# Patient Record
Sex: FEMALE | Race: BLACK OR AFRICAN AMERICAN | NOT HISPANIC OR LATINO | URBAN - METROPOLITAN AREA
[De-identification: names, ages, dates, MRNs, and addresses within clinical notes are randomized per-mention and may not be internally consistent; named-entity substitution may affect disease eponyms.]

---

## 2017-01-27 ENCOUNTER — EMERGENCY (EMERGENCY)
Facility: HOSPITAL | Age: 37
LOS: 0 days | Discharge: HOME | End: 2017-01-27
Attending: EMERGENCY MEDICINE
Payer: COMMERCIAL

## 2017-01-27 VITALS
HEART RATE: 89 BPM | TEMPERATURE: 98 F | SYSTOLIC BLOOD PRESSURE: 126 MMHG | WEIGHT: 169.98 LBS | HEIGHT: 69 IN | RESPIRATION RATE: 20 BRPM | DIASTOLIC BLOOD PRESSURE: 67 MMHG | OXYGEN SATURATION: 96 %

## 2017-01-27 VITALS — SYSTOLIC BLOOD PRESSURE: 117 MMHG | HEART RATE: 89 BPM | TEMPERATURE: 99 F | DIASTOLIC BLOOD PRESSURE: 63 MMHG

## 2017-01-27 DIAGNOSIS — R11.0 NAUSEA: ICD-10-CM

## 2017-01-27 DIAGNOSIS — O21.0 MILD HYPEREMESIS GRAVIDARUM: ICD-10-CM

## 2017-01-27 LAB
ALBUMIN SERPL ELPH-MCNC: 3.4 G/DL — SIGNIFICANT CHANGE UP (ref 3.3–5)
ALP SERPL-CCNC: 62 U/L — SIGNIFICANT CHANGE UP (ref 40–120)
ALT FLD-CCNC: 23 U/L — SIGNIFICANT CHANGE UP (ref 12–78)
ANION GAP SERPL CALC-SCNC: 11 MMOL/L — SIGNIFICANT CHANGE UP (ref 5–17)
APPEARANCE UR: CLEAR — SIGNIFICANT CHANGE UP
AST SERPL-CCNC: 17 U/L — SIGNIFICANT CHANGE UP (ref 15–37)
BASOPHILS # BLD AUTO: 0 K/UL — SIGNIFICANT CHANGE UP (ref 0–0.2)
BASOPHILS NFR BLD AUTO: 0.5 % — SIGNIFICANT CHANGE UP (ref 0–2)
BILIRUB SERPL-MCNC: 0.4 MG/DL — SIGNIFICANT CHANGE UP (ref 0.2–1.2)
BILIRUB UR-MCNC: NEGATIVE — SIGNIFICANT CHANGE UP
BLD GP AB SCN SERPL QL: SIGNIFICANT CHANGE UP
BUN SERPL-MCNC: 7 MG/DL — SIGNIFICANT CHANGE UP (ref 7–23)
CALCIUM SERPL-MCNC: 9 MG/DL — SIGNIFICANT CHANGE UP (ref 8.5–10.1)
CHLORIDE SERPL-SCNC: 103 MMOL/L — SIGNIFICANT CHANGE UP (ref 96–108)
CO2 SERPL-SCNC: 25 MMOL/L — SIGNIFICANT CHANGE UP (ref 22–31)
COLOR SPEC: YELLOW — SIGNIFICANT CHANGE UP
COMMENT - URINE: SIGNIFICANT CHANGE UP
CREAT SERPL-MCNC: 0.7 MG/DL — SIGNIFICANT CHANGE UP (ref 0.5–1.3)
DIFF PNL FLD: NEGATIVE — SIGNIFICANT CHANGE UP
EOSINOPHIL # BLD AUTO: 0.2 K/UL — SIGNIFICANT CHANGE UP (ref 0–0.5)
EOSINOPHIL NFR BLD AUTO: 3 % — SIGNIFICANT CHANGE UP (ref 0–6)
EPI CELLS # UR: SIGNIFICANT CHANGE UP
GLUCOSE SERPL-MCNC: 95 MG/DL — SIGNIFICANT CHANGE UP (ref 70–99)
GLUCOSE UR QL: NEGATIVE MG/DL — SIGNIFICANT CHANGE UP
HCG SERPL-ACNC: SIGNIFICANT CHANGE UP MIU/ML
HCT VFR BLD CALC: 37.9 % — SIGNIFICANT CHANGE UP (ref 34.5–45)
HGB BLD-MCNC: 12.8 G/DL — SIGNIFICANT CHANGE UP (ref 11.5–15.5)
KETONES UR-MCNC: ABNORMAL
LEUKOCYTE ESTERASE UR-ACNC: NEGATIVE — SIGNIFICANT CHANGE UP
LYMPHOCYTES # BLD AUTO: 1.9 K/UL — SIGNIFICANT CHANGE UP (ref 1–3.3)
LYMPHOCYTES # BLD AUTO: 23.9 % — SIGNIFICANT CHANGE UP (ref 13–44)
MCHC RBC-ENTMCNC: 28.3 PG — SIGNIFICANT CHANGE UP (ref 27–34)
MCHC RBC-ENTMCNC: 33.6 GM/DL — SIGNIFICANT CHANGE UP (ref 32–36)
MCV RBC AUTO: 84.1 FL — SIGNIFICANT CHANGE UP (ref 80–100)
MONOCYTES # BLD AUTO: 0.6 K/UL — SIGNIFICANT CHANGE UP (ref 0–0.9)
MONOCYTES NFR BLD AUTO: 7.9 % — SIGNIFICANT CHANGE UP (ref 2–14)
NEUTROPHILS # BLD AUTO: 5.3 K/UL — SIGNIFICANT CHANGE UP (ref 1.8–7.4)
NEUTROPHILS NFR BLD AUTO: 64.7 % — SIGNIFICANT CHANGE UP (ref 43–77)
NITRITE UR-MCNC: NEGATIVE — SIGNIFICANT CHANGE UP
PH UR: 6 — SIGNIFICANT CHANGE UP (ref 4.8–8)
PLATELET # BLD AUTO: 355 K/UL — SIGNIFICANT CHANGE UP (ref 150–400)
POTASSIUM SERPL-MCNC: 3.5 MMOL/L — SIGNIFICANT CHANGE UP (ref 3.5–5.3)
POTASSIUM SERPL-SCNC: 3.5 MMOL/L — SIGNIFICANT CHANGE UP (ref 3.5–5.3)
PROT SERPL-MCNC: 8.2 GM/DL — SIGNIFICANT CHANGE UP (ref 6–8.3)
PROT UR-MCNC: 30 MG/DL
RBC # BLD: 4.51 M/UL — SIGNIFICANT CHANGE UP (ref 3.8–5.2)
RBC # FLD: 14.8 % — SIGNIFICANT CHANGE UP (ref 11–15)
SODIUM SERPL-SCNC: 139 MMOL/L — SIGNIFICANT CHANGE UP (ref 135–145)
SP GR SPEC: 1.02 — SIGNIFICANT CHANGE UP (ref 1.01–1.02)
UROBILINOGEN FLD QL: NEGATIVE MG/DL — SIGNIFICANT CHANGE UP
WBC # BLD: 8.1 K/UL — SIGNIFICANT CHANGE UP (ref 3.8–10.5)
WBC # FLD AUTO: 8.1 K/UL — SIGNIFICANT CHANGE UP (ref 3.8–10.5)
WBC UR QL: SIGNIFICANT CHANGE UP

## 2017-01-27 PROCEDURE — 76856 US EXAM PELVIC COMPLETE: CPT | Mod: 26

## 2017-01-27 PROCEDURE — 76815 OB US LIMITED FETUS(S): CPT | Mod: 26

## 2017-01-27 PROCEDURE — 76817 TRANSVAGINAL US OBSTETRIC: CPT | Mod: 26

## 2017-01-27 PROCEDURE — 99285 EMERGENCY DEPT VISIT HI MDM: CPT | Mod: 25

## 2017-01-27 RX ORDER — ONDANSETRON 8 MG/1
1 TABLET, FILM COATED ORAL
Qty: 18 | Refills: 0 | OUTPATIENT
Start: 2017-01-27 | End: 2017-02-02

## 2017-01-27 RX ORDER — ONDANSETRON 8 MG/1
8 TABLET, FILM COATED ORAL ONCE
Qty: 0 | Refills: 0 | Status: COMPLETED | OUTPATIENT
Start: 2017-01-27 | End: 2017-01-27

## 2017-01-27 RX ORDER — PYRIDOXINE HCL (VITAMIN B6) 100 MG
100 TABLET ORAL ONCE
Qty: 0 | Refills: 0 | Status: COMPLETED | OUTPATIENT
Start: 2017-01-27 | End: 2017-01-27

## 2017-01-27 RX ORDER — SODIUM CHLORIDE 9 MG/ML
2000 INJECTION INTRAMUSCULAR; INTRAVENOUS; SUBCUTANEOUS ONCE
Qty: 0 | Refills: 0 | Status: COMPLETED | OUTPATIENT
Start: 2017-01-27 | End: 2017-01-27

## 2017-01-27 RX ADMIN — Medication 100 MILLIGRAM(S): at 01:47

## 2017-01-27 RX ADMIN — SODIUM CHLORIDE 2000 MILLILITER(S): 9 INJECTION INTRAMUSCULAR; INTRAVENOUS; SUBCUTANEOUS at 01:47

## 2017-01-27 RX ADMIN — ONDANSETRON 8 MILLIGRAM(S): 8 TABLET, FILM COATED ORAL at 01:47

## 2017-01-27 RX ADMIN — Medication 202 MILLIGRAM(S): at 05:12

## 2017-01-27 NOTE — ED ADULT TRIAGE NOTE - CHIEF COMPLAINT QUOTE
"9 1/2 weeks pregnant,nausea ,vomiting for weeks,on and off lower abdominal cramping,no vaginal bleed"

## 2017-01-27 NOTE — ED PROVIDER NOTE - MEDICAL DECISION MAKING DETAILS
patient pw nausea and vomiting. iup present. hyperemesis gravidarum suspected. vit b6 and zofran. patient feeling better.

## 2017-01-27 NOTE — ED PROVIDER NOTE - OBJECTIVE STATEMENT
Pertinent PMH/PSH/FHx/SHx and Review of Systems contained within:  36f  at estimated 10wks pw nasuea and vomiting with abd cramping that started today. she has had previous experiences with her other pregnancies. no vaginal bleeding, no contractions. has not had a 1st trim sono. no fever, no unusual foods. has not tried anything for nausea yet  Fh and Sh not otherwise contributory  ROS otherwise negative

## 2017-01-28 LAB
CULTURE RESULTS: SIGNIFICANT CHANGE UP
SPECIMEN SOURCE: SIGNIFICANT CHANGE UP

## 2017-02-17 ENCOUNTER — EMERGENCY (EMERGENCY)
Facility: HOSPITAL | Age: 37
LOS: 0 days | Discharge: HOME | End: 2017-02-18
Attending: EMERGENCY MEDICINE
Payer: COMMERCIAL

## 2017-02-17 VITALS
WEIGHT: 175.05 LBS | TEMPERATURE: 99 F | RESPIRATION RATE: 16 BRPM | SYSTOLIC BLOOD PRESSURE: 125 MMHG | OXYGEN SATURATION: 98 % | HEIGHT: 69 IN | HEART RATE: 113 BPM | DIASTOLIC BLOOD PRESSURE: 67 MMHG

## 2017-02-17 DIAGNOSIS — R11.0 NAUSEA: ICD-10-CM

## 2017-02-17 DIAGNOSIS — O21.0 MILD HYPEREMESIS GRAVIDARUM: ICD-10-CM

## 2017-02-17 DIAGNOSIS — J45.901 UNSPECIFIED ASTHMA WITH (ACUTE) EXACERBATION: ICD-10-CM

## 2017-02-17 PROCEDURE — 99284 EMERGENCY DEPT VISIT MOD MDM: CPT

## 2017-02-18 VITALS
RESPIRATION RATE: 20 BRPM | TEMPERATURE: 100 F | SYSTOLIC BLOOD PRESSURE: 122 MMHG | HEART RATE: 112 BPM | DIASTOLIC BLOOD PRESSURE: 56 MMHG | OXYGEN SATURATION: 98 %

## 2017-02-18 LAB
ALBUMIN SERPL ELPH-MCNC: 3 G/DL — LOW (ref 3.3–5)
ALP SERPL-CCNC: 54 U/L — SIGNIFICANT CHANGE UP (ref 40–120)
ALT FLD-CCNC: 17 U/L — SIGNIFICANT CHANGE UP (ref 12–78)
ANION GAP SERPL CALC-SCNC: 15 MMOL/L — SIGNIFICANT CHANGE UP (ref 5–17)
APPEARANCE UR: CLEAR — SIGNIFICANT CHANGE UP
AST SERPL-CCNC: 17 U/L — SIGNIFICANT CHANGE UP (ref 15–37)
BACTERIA # UR AUTO: ABNORMAL
BASOPHILS # BLD AUTO: 0.1 K/UL — SIGNIFICANT CHANGE UP (ref 0–0.2)
BASOPHILS NFR BLD AUTO: 1.2 % — SIGNIFICANT CHANGE UP (ref 0–2)
BILIRUB SERPL-MCNC: 0.4 MG/DL — SIGNIFICANT CHANGE UP (ref 0.2–1.2)
BILIRUB UR-MCNC: NEGATIVE — SIGNIFICANT CHANGE UP
BUN SERPL-MCNC: 7 MG/DL — SIGNIFICANT CHANGE UP (ref 7–23)
CALCIUM SERPL-MCNC: 8.3 MG/DL — LOW (ref 8.5–10.1)
CHLORIDE SERPL-SCNC: 100 MMOL/L — SIGNIFICANT CHANGE UP (ref 96–108)
CO2 SERPL-SCNC: 21 MMOL/L — LOW (ref 22–31)
COLOR SPEC: YELLOW — SIGNIFICANT CHANGE UP
COMMENT - URINE: SIGNIFICANT CHANGE UP
CREAT SERPL-MCNC: 0.75 MG/DL — SIGNIFICANT CHANGE UP (ref 0.5–1.3)
DIFF PNL FLD: NEGATIVE — SIGNIFICANT CHANGE UP
EOSINOPHIL # BLD AUTO: 0 K/UL — SIGNIFICANT CHANGE UP (ref 0–0.5)
EOSINOPHIL NFR BLD AUTO: 0.2 % — SIGNIFICANT CHANGE UP (ref 0–6)
EPI CELLS # UR: SIGNIFICANT CHANGE UP
FLUAV SPEC QL CULT: NEGATIVE — SIGNIFICANT CHANGE UP
FLUBV AG SPEC QL IA: NEGATIVE — SIGNIFICANT CHANGE UP
GLUCOSE SERPL-MCNC: 102 MG/DL — HIGH (ref 70–99)
GLUCOSE UR QL: 100 MG/DL
HCG SERPL-ACNC: SIGNIFICANT CHANGE UP MIU/ML
HCT VFR BLD CALC: 32.5 % — LOW (ref 34.5–45)
HGB BLD-MCNC: 11.5 G/DL — SIGNIFICANT CHANGE UP (ref 11.5–15.5)
HYALINE CASTS # UR AUTO: ABNORMAL /LPF
KETONES UR-MCNC: ABNORMAL
LEUKOCYTE ESTERASE UR-ACNC: NEGATIVE — SIGNIFICANT CHANGE UP
LYMPHOCYTES # BLD AUTO: 0.9 K/UL — LOW (ref 1–3.3)
LYMPHOCYTES # BLD AUTO: 14.2 % — SIGNIFICANT CHANGE UP (ref 13–44)
MCHC RBC-ENTMCNC: 28.5 PG — SIGNIFICANT CHANGE UP (ref 27–34)
MCHC RBC-ENTMCNC: 35.4 GM/DL — SIGNIFICANT CHANGE UP (ref 32–36)
MCV RBC AUTO: 80.5 FL — SIGNIFICANT CHANGE UP (ref 80–100)
MONOCYTES # BLD AUTO: 0.6 K/UL — SIGNIFICANT CHANGE UP (ref 0–0.9)
MONOCYTES NFR BLD AUTO: 9.7 % — SIGNIFICANT CHANGE UP (ref 2–14)
NEUTROPHILS # BLD AUTO: 4.7 K/UL — SIGNIFICANT CHANGE UP (ref 1.8–7.4)
NEUTROPHILS NFR BLD AUTO: 74.8 % — SIGNIFICANT CHANGE UP (ref 43–77)
NITRITE UR-MCNC: NEGATIVE — SIGNIFICANT CHANGE UP
PH UR: 6 — SIGNIFICANT CHANGE UP (ref 4.8–8)
PLATELET # BLD AUTO: 311 K/UL — SIGNIFICANT CHANGE UP (ref 150–400)
POTASSIUM SERPL-MCNC: 3.1 MMOL/L — LOW (ref 3.5–5.3)
POTASSIUM SERPL-SCNC: 3.1 MMOL/L — LOW (ref 3.5–5.3)
PROT SERPL-MCNC: 7.5 GM/DL — SIGNIFICANT CHANGE UP (ref 6–8.3)
PROT UR-MCNC: 30 MG/DL
RBC # BLD: 4.04 M/UL — SIGNIFICANT CHANGE UP (ref 3.8–5.2)
RBC # FLD: 14.2 % — SIGNIFICANT CHANGE UP (ref 11–15)
RBC CASTS # UR COMP ASSIST: SIGNIFICANT CHANGE UP /HPF (ref 0–4)
SODIUM SERPL-SCNC: 136 MMOL/L — SIGNIFICANT CHANGE UP (ref 135–145)
SP GR SPEC: 1.02 — SIGNIFICANT CHANGE UP (ref 1.01–1.02)
UROBILINOGEN FLD QL: 1 MG/DL
WBC # BLD: 6.4 K/UL — SIGNIFICANT CHANGE UP (ref 3.8–10.5)
WBC # FLD AUTO: 6.4 K/UL — SIGNIFICANT CHANGE UP (ref 3.8–10.5)
WBC UR QL: SIGNIFICANT CHANGE UP

## 2017-02-18 RX ORDER — SODIUM CHLORIDE 9 MG/ML
1000 INJECTION INTRAMUSCULAR; INTRAVENOUS; SUBCUTANEOUS ONCE
Qty: 0 | Refills: 0 | Status: COMPLETED | OUTPATIENT
Start: 2017-02-18 | End: 2017-02-18

## 2017-02-18 RX ORDER — ACETAMINOPHEN 500 MG
975 TABLET ORAL ONCE
Qty: 0 | Refills: 0 | Status: COMPLETED | OUTPATIENT
Start: 2017-02-18 | End: 2017-02-18

## 2017-02-18 RX ORDER — PYRIDOXINE HCL (VITAMIN B6) 100 MG
5 TABLET ORAL ONCE
Qty: 0 | Refills: 0 | Status: DISCONTINUED | OUTPATIENT
Start: 2017-02-18 | End: 2017-02-18

## 2017-02-18 RX ORDER — METOCLOPRAMIDE HCL 10 MG
10 TABLET ORAL ONCE
Qty: 0 | Refills: 0 | Status: COMPLETED | OUTPATIENT
Start: 2017-02-18 | End: 2017-02-18

## 2017-02-18 RX ORDER — POTASSIUM CHLORIDE 20 MEQ
40 PACKET (EA) ORAL ONCE
Qty: 0 | Refills: 0 | Status: COMPLETED | OUTPATIENT
Start: 2017-02-18 | End: 2017-02-18

## 2017-02-18 RX ORDER — AMOXICILLIN 250 MG/5ML
1 SUSPENSION, RECONSTITUTED, ORAL (ML) ORAL
Qty: 20 | Refills: 0 | OUTPATIENT
Start: 2017-02-18 | End: 2017-02-28

## 2017-02-18 RX ORDER — ALBUTEROL 90 UG/1
2 AEROSOL, METERED ORAL
Qty: 1 | Refills: 0 | OUTPATIENT
Start: 2017-02-18 | End: 2017-03-20

## 2017-02-18 RX ORDER — ALBUTEROL 90 UG/1
2.5 AEROSOL, METERED ORAL ONCE
Qty: 0 | Refills: 0 | Status: COMPLETED | OUTPATIENT
Start: 2017-02-18 | End: 2017-02-18

## 2017-02-18 RX ORDER — IPRATROPIUM/ALBUTEROL SULFATE 18-103MCG
3 AEROSOL WITH ADAPTER (GRAM) INHALATION ONCE
Qty: 0 | Refills: 0 | Status: COMPLETED | OUTPATIENT
Start: 2017-02-18 | End: 2017-02-18

## 2017-02-18 RX ORDER — DOXYLAMINE SUCCINATE AND PYRIDOXINE HYDROCHLORIDE, DELAYED RELEASE TABLETS 10 MG/10 MG 10; 10 MG/1; MG/1
2 TABLET, DELAYED RELEASE ORAL
Qty: 60 | Refills: 0 | OUTPATIENT
Start: 2017-02-18 | End: 2017-03-20

## 2017-02-18 RX ADMIN — Medication 3 MILLILITER(S): at 01:44

## 2017-02-18 RX ADMIN — Medication 975 MILLIGRAM(S): at 04:57

## 2017-02-18 RX ADMIN — ALBUTEROL 2.5 MILLIGRAM(S): 90 AEROSOL, METERED ORAL at 01:47

## 2017-02-18 RX ADMIN — SODIUM CHLORIDE 1000 MILLILITER(S): 9 INJECTION INTRAMUSCULAR; INTRAVENOUS; SUBCUTANEOUS at 01:44

## 2017-02-18 RX ADMIN — Medication 40 MILLIEQUIVALENT(S): at 03:05

## 2017-02-18 RX ADMIN — SODIUM CHLORIDE 1000 MILLILITER(S): 9 INJECTION INTRAMUSCULAR; INTRAVENOUS; SUBCUTANEOUS at 01:45

## 2017-02-18 RX ADMIN — Medication 10 MILLIGRAM(S): at 01:44

## 2017-02-18 NOTE — ED PROVIDER NOTE - OBJECTIVE STATEMENT
Pertinent PMH/PSH/FHx/SHx and Review of Systems contained within:  Patient with history of asthma and hyperemesis gravidarum (never admitted) is  @ 15 weeks by US, presents to the ED for shortness of breath, nausea, and vomiting.  Says that she has presented to the ER with same issues in the past requiring control of nausea and asthma.  Nonsmoker, but reports a cough with clear sputum x2 days which she believes triggered her asthma.  Denies any calf pain or swelling, says that this feels like her typical asthma exacerbation.  Patient has had similar issues with previous pregnancies.  Denies any vaginal discharge or bleeding.     No fever/chills, No photophobia/eye pain/changes in vision, No ear pain/sore throat/dysphagia, No chest pain/palpitations, No abdominal pain, no dysuria/frequency/discharge, No neck/back pain, no rash, no changes in neurological status/function.

## 2017-02-18 NOTE — ED PROVIDER NOTE - MEDICAL DECISION MAKING DETAILS
Patient is pregnant with recent mild cough, asthma attack, and hyperemesis gravidarum.  Patient's presentation is not unusual for her.  PE considered, but unlikely.  Patient felt much better after IVF, antiemetic, and nebs, requesting discharge.  Requests scripts for antiemetic and inhaler.  No antibiotics since cough is 2 days, mild, and no fevers.  UA with mild contamination, no symptoms, culture sent.  Discussed results and outcome of today's visit with the patient.  Patient advised to please follow up with their primary care doctor and obstetrician within the next 24 hours and return to the Emergency Department for worsening symptoms or any other concerns.  Patient advised that their doctor may call  to follow up on the specific results of the tests performed today in the emergency department.   Patient appears well on discharge. Patient is pregnant with recent mild cough, asthma attack, and hyperemesis gravidarum.  Patient's presentation is not unusual for her.  PE considered, but unlikely.  Patient felt much better after IVF, antiemetic, and nebs, requesting discharge.  Patient was to be discharged but found to be febrile. Patient is pregnant with recent mild cough, asthma attack, and hyperemesis gravidarum.  Patient's presentation is not unusual for her.  PE considered, but unlikely.  Patient felt much better after IVF, antiemetic, and nebs, requesting discharge.  Patient was to be discharged but found to be febrile.  Influenza negative.  Appears non toxic, non septic.  Will cover with abx.  Felt much improved after tylenol.  Recommend ongoing supportive care with tylenol/fluids/rest and prompt OB follow up today.  Discussed results and outcome of today's visit with the patient.  Patient advised to please follow up with their primary care doctor and OB today and return to the Emergency Department for worsening symptoms or any other concerns.  Patient advised that their doctor may call  to follow up on the specific results of the tests performed today in the emergency department.   Patient appears well on discharge.  Patient given prescription medications for their condition and advised to take them as prescribed and check with their Primary Care Provider if any questions arise.

## 2017-02-19 LAB
CULTURE RESULTS: SIGNIFICANT CHANGE UP
SPECIMEN SOURCE: SIGNIFICANT CHANGE UP

## 2017-05-31 ENCOUNTER — INPATIENT (INPATIENT)
Facility: HOSPITAL | Age: 37
LOS: 1 days | Discharge: HOME | End: 2017-06-02
Attending: INTERNAL MEDICINE | Admitting: INTERNAL MEDICINE
Payer: COMMERCIAL

## 2017-05-31 VITALS
WEIGHT: 175.05 LBS | OXYGEN SATURATION: 99 % | HEART RATE: 128 BPM | TEMPERATURE: 98 F | DIASTOLIC BLOOD PRESSURE: 74 MMHG | RESPIRATION RATE: 20 BRPM | SYSTOLIC BLOOD PRESSURE: 122 MMHG

## 2017-05-31 LAB
BASOPHILS # BLD AUTO: 0.01 K/UL — SIGNIFICANT CHANGE UP (ref 0–0.2)
BASOPHILS NFR BLD AUTO: 0.1 % — SIGNIFICANT CHANGE UP (ref 0–2)
BUN SERPL-MCNC: 8 MG/DL — SIGNIFICANT CHANGE UP (ref 7–23)
CALCIUM SERPL-MCNC: 8.5 MG/DL — SIGNIFICANT CHANGE UP (ref 8.4–10.5)
CHLORIDE SERPL-SCNC: 100 MMOL/L — SIGNIFICANT CHANGE UP (ref 98–107)
CO2 SERPL-SCNC: 18 MMOL/L — LOW (ref 22–31)
CREAT SERPL-MCNC: 0.73 MG/DL — SIGNIFICANT CHANGE UP (ref 0.5–1.3)
EOSINOPHIL # BLD AUTO: 0.01 K/UL — SIGNIFICANT CHANGE UP (ref 0–0.5)
EOSINOPHIL NFR BLD AUTO: 0.1 % — SIGNIFICANT CHANGE UP (ref 0–6)
GLUCOSE SERPL-MCNC: 171 MG/DL — HIGH (ref 70–99)
HCT VFR BLD CALC: 28.9 % — LOW (ref 34.5–45)
HGB BLD-MCNC: 9.3 G/DL — LOW (ref 11.5–15.5)
IMM GRANULOCYTES NFR BLD AUTO: 0.7 % — SIGNIFICANT CHANGE UP (ref 0–1.5)
LYMPHOCYTES # BLD AUTO: 0.47 K/UL — LOW (ref 1–3.3)
LYMPHOCYTES # BLD AUTO: 5.3 % — LOW (ref 13–44)
MAGNESIUM SERPL-MCNC: 1.8 MG/DL — SIGNIFICANT CHANGE UP (ref 1.6–2.6)
MCHC RBC-ENTMCNC: 27 PG — SIGNIFICANT CHANGE UP (ref 27–34)
MCHC RBC-ENTMCNC: 32.2 % — SIGNIFICANT CHANGE UP (ref 32–36)
MCV RBC AUTO: 84 FL — SIGNIFICANT CHANGE UP (ref 80–100)
MONOCYTES # BLD AUTO: 0.13 K/UL — SIGNIFICANT CHANGE UP (ref 0–0.9)
MONOCYTES NFR BLD AUTO: 1.5 % — LOW (ref 2–14)
NEUTROPHILS # BLD AUTO: 8.22 K/UL — HIGH (ref 1.8–7.4)
NEUTROPHILS NFR BLD AUTO: 92.3 % — HIGH (ref 43–77)
PLATELET # BLD AUTO: 261 K/UL — SIGNIFICANT CHANGE UP (ref 150–400)
PMV BLD: 9.7 FL — SIGNIFICANT CHANGE UP (ref 7–13)
POTASSIUM SERPL-MCNC: 3.2 MMOL/L — LOW (ref 3.5–5.3)
POTASSIUM SERPL-SCNC: 3.2 MMOL/L — LOW (ref 3.5–5.3)
RBC # BLD: 3.44 M/UL — LOW (ref 3.8–5.2)
RBC # FLD: 16 % — HIGH (ref 10.3–14.5)
SODIUM SERPL-SCNC: 135 MMOL/L — SIGNIFICANT CHANGE UP (ref 135–145)
WBC # BLD: 8.9 K/UL — SIGNIFICANT CHANGE UP (ref 3.8–10.5)
WBC # FLD AUTO: 8.9 K/UL — SIGNIFICANT CHANGE UP (ref 3.8–10.5)

## 2017-05-31 PROCEDURE — 71020: CPT | Mod: 26

## 2017-05-31 RX ORDER — IPRATROPIUM/ALBUTEROL SULFATE 18-103MCG
3 AEROSOL WITH ADAPTER (GRAM) INHALATION ONCE
Qty: 0 | Refills: 0 | Status: COMPLETED | OUTPATIENT
Start: 2017-05-31 | End: 2017-05-31

## 2017-05-31 RX ORDER — POTASSIUM CHLORIDE 20 MEQ
40 PACKET (EA) ORAL ONCE
Qty: 0 | Refills: 0 | Status: COMPLETED | OUTPATIENT
Start: 2017-05-31 | End: 2017-05-31

## 2017-05-31 RX ORDER — IPRATROPIUM/ALBUTEROL SULFATE 18-103MCG
3 AEROSOL WITH ADAPTER (GRAM) INHALATION EVERY 4 HOURS
Qty: 0 | Refills: 0 | Status: DISCONTINUED | OUTPATIENT
Start: 2017-05-31 | End: 2017-06-02

## 2017-05-31 RX ORDER — SODIUM CHLORIDE 9 MG/ML
1000 INJECTION INTRAMUSCULAR; INTRAVENOUS; SUBCUTANEOUS ONCE
Qty: 0 | Refills: 0 | Status: COMPLETED | OUTPATIENT
Start: 2017-05-31 | End: 2017-05-31

## 2017-05-31 RX ORDER — SODIUM CHLORIDE 9 MG/ML
1000 INJECTION INTRAMUSCULAR; INTRAVENOUS; SUBCUTANEOUS
Qty: 0 | Refills: 0 | Status: DISCONTINUED | OUTPATIENT
Start: 2017-05-31 | End: 2017-06-01

## 2017-05-31 RX ORDER — ACETAMINOPHEN 500 MG
650 TABLET ORAL ONCE
Qty: 0 | Refills: 0 | Status: COMPLETED | OUTPATIENT
Start: 2017-05-31 | End: 2017-05-31

## 2017-05-31 RX ORDER — SODIUM CHLORIDE 9 MG/ML
3 INJECTION INTRAMUSCULAR; INTRAVENOUS; SUBCUTANEOUS ONCE
Qty: 0 | Refills: 0 | Status: COMPLETED | OUTPATIENT
Start: 2017-05-31 | End: 2017-05-31

## 2017-05-31 RX ADMIN — Medication 650 MILLIGRAM(S): at 18:00

## 2017-05-31 RX ADMIN — Medication 60 MILLIGRAM(S): at 13:12

## 2017-05-31 RX ADMIN — SODIUM CHLORIDE 3 MILLILITER(S): 9 INJECTION INTRAMUSCULAR; INTRAVENOUS; SUBCUTANEOUS at 18:26

## 2017-05-31 RX ADMIN — SODIUM CHLORIDE 125 MILLILITER(S): 9 INJECTION INTRAMUSCULAR; INTRAVENOUS; SUBCUTANEOUS at 20:46

## 2017-05-31 RX ADMIN — Medication 3 MILLILITER(S): at 23:40

## 2017-05-31 RX ADMIN — Medication 650 MILLIGRAM(S): at 16:04

## 2017-05-31 RX ADMIN — Medication 3 MILLILITER(S): at 20:17

## 2017-05-31 RX ADMIN — Medication 3 MILLILITER(S): at 16:55

## 2017-05-31 RX ADMIN — Medication 650 MILLIGRAM(S): at 13:38

## 2017-05-31 RX ADMIN — Medication 650 MILLIGRAM(S): at 18:30

## 2017-05-31 RX ADMIN — Medication 40 MILLIEQUIVALENT(S): at 20:18

## 2017-05-31 RX ADMIN — Medication 3 MILLILITER(S): at 13:12

## 2017-05-31 RX ADMIN — SODIUM CHLORIDE 2000 MILLILITER(S): 9 INJECTION INTRAMUSCULAR; INTRAVENOUS; SUBCUTANEOUS at 18:27

## 2017-05-31 RX ADMIN — Medication 3 MILLILITER(S): at 13:38

## 2017-05-31 NOTE — ED PROVIDER NOTE - OBJECTIVE STATEMENT
37F h/o asthma  29wks pregnant presenting with difficulty breathing. Symptoms started 2 days ago with productive cough (yellow), sore throat, wheezing, and difficulty breathing, sick contact at home with similar symptoms. Notes 4 ER visits this pregnancy for asthma-related symptoms. Has been using albuterol bid for past 2 days without improvement. Has never been admitted for asthma.

## 2017-05-31 NOTE — ED CDU PROVIDER NOTE - OBJECTIVE STATEMENT
36 y/o female with pmhx of asthma,  29 weeks pregnant, due date 17 presents to ED with       Home meds: none 38 y/o female with pmhx of asthma (since teenager, no intubations), iron deficiency anemia,  29 weeks pregnant, due date 17 presents to ED with productive cough x 3 days. Pt states her sons are sick at home with coughs. c/o productive cough with clear sputum associated with SOB x 3 days, no chest pain. Treatments have helped in ED although still c/o mild SOB now. States her symptoms are at baseline with her asthma exacerbations. Pt states her nausea is at baseline with her pregnancy and morning sickness, no vomiting. No fever, chills, earache, sore throat, nasal congestion, cp, palpitations, recent surgeries, calf pain/swelling,       Home meds: none 36 y/o female with pmhx of asthma (since teenager, no intubations), iron deficiency anemia,  29 weeks pregnant, due date 17 presents to ED with productive cough x 3 days. Pt states her sons are sick at home with coughs. Pt c/o productive cough with clear sputum associated with SOB x 3 days, no chest pain. +body aches, +sore throat. Treatments have helped in ED although still c/o mild SOB now. States her symptoms are at baseline with her asthma exacerbations. Pt endorses nausea at baseline with her pregnancy and morning sickness, no vomiting. No fever, chills, earache, difficulty swallowing, nasal congestion, cp, palpitations, syncope, recent surgeries, calf pain/swelling, abdominal pain, vomiting, weakness, numbness, urinary complaints.      Home meds: none 38 y/o female with pmhx of asthma (since teenager, no intubations, couple visits to [facility] during pregnancy for asthma), iron deficiency anemia,  29 weeks pregnant, due date 17 presents to ED with productive cough x 3 days. Pt states her sons are sick at home with coughs. Pt c/o productive cough with clear sputum associated with SOB x 3 days, no chest pain. +body aches, +sore throat. Treatments have helped in ED although still c/o mild SOB now. States her symptoms are at baseline with her asthma exacerbations. Pt endorses nausea at baseline with her pregnancy and morning sickness, no vomiting. No fever, chills, earache, difficulty swallowing, nasal congestion, cp, hemoptysis, palpitations, syncope, recent surgeries, calf pain/swelling, abdominal pain, vomiting, weakness, numbness, urinary complaints.    Home meds: none

## 2017-05-31 NOTE — ED PROVIDER NOTE - ATTENDING CONTRIBUTION TO CARE
29 weeks hx of asthma, c/o productive cough of green sputum, associated with viral syndrome and low grade fever which exacerbated asthma. Exam; decreased BS left side with wheezing; right side scant wheezing; peak flow 200 with questionable effort. Given symptoms X-ray clinically indicated; continue nebulizers; prednisone; consider CDU

## 2017-05-31 NOTE — ED CDU PROVIDER NOTE - MEDICAL DECISION MAKING DETAILS
38 y/o female with pmhx of asthma (since teenager, no intubations, couple visits to [facility] during pregnancy for asthma), iron deficiency anemia,  29 weeks pregnant, due date 17 presents to ED with productive cough x 3 days. Plan: OBGYN consult, caty x 4, continue prednisone once daily tomrorow, fluids, reassess

## 2017-05-31 NOTE — ED PROVIDER NOTE - MEDICAL DECISION MAKING DETAILS
37F  29wks h/o asthma p/w SOB, cough, wheezing, multiple ER visits for asthma this pregnancy, concerning for asthma exac  -nebs, steroids, XR, re-assess

## 2017-05-31 NOTE — ED ADULT TRIAGE NOTE - CCCP TRG CHIEF CMPLNT
29 wks preg w SOB, since Monday, w productive, yellow cough, NO abd/pregnany complaints 29 wks preg w SOB, since Monday, w productive, yellow cough, NO abd/pregnancy complaints

## 2017-05-31 NOTE — ED ADULT NURSE NOTE - OBJECTIVE STATEMENT
Patient received in Intake RM 12 A&Ox3 and ambulatory at baseline. Patient 29 weeks pregnant c/o asthma exacerbation since Sunday. Symptoms unrelieved with saline nebulizer treatments at home. Denies fevers, chills, abdominal pain, N/V/D, abdominal cramping, vaginal bleeding. PE and history as noted below.

## 2017-05-31 NOTE — ED PROVIDER NOTE - PROGRESS NOTE DETAILS
Repeat peak flow 320 after first 2 nebs, pt feeling better, resp exam with improved air movement. Will observe for further evaluation.

## 2017-05-31 NOTE — ED CDU PROVIDER NOTE - PROGRESS NOTE DETAILS
ANTONIO Ospinau - Fetal HR confirmed at bedside at 154. RVP panel +HMPV, pt placed on contact. OBGYN aware of pt, awaiting consultation. ANTONIO Hoffman - Spoke with OBGYN, recommending outpt f/u for 1AC and will do further fetal monitoring. Spoke with pulmonary, agrees with plan and supportive management, will f/u VBG and most likely see pt in the AM, states pt stable. ANTONIO Hoffman - Spoke with PEGGY Galarza, recommending outpt f/u for 1AC and will do further fetal monitoring, no concern for PE. Spoke with pulmonary, agrees with plan and supportive management, will f/u VBG and most likely see pt in the AM, states pt stable. ANTONIO Hoffman - pt c/o one episode of NBNB vomiting, nausea at baseline due to pregnancy. will give zofran 4mg IV. ANTONIO Hoffman - pt was sleeping in bed comfortably, NAD. pt states she is feeling better. Exam: no intercostal retractions, speaking in full sentences. +mild wheezing on upper lobes b/l. awaiting pulm consult today. pt will be signed out to day team. ANTONIO Markham: pt signed out to me from overnight, tolerating q 4 nebs however reports she feels tight/wheezing after 2 or 3 hours from neb. Seen by pulm in CDU, advised to d/c fluids, prednisone 20 qd, and continue nebs. Will recommend controller med for home however patient is feeling uncomfortable going home given continued symptoms. Pulm agrees with plan for admission to medicine as pt is cleared by OB in ED stay. Pt agrees with plan. Admitted to hospitalist Blank as pts PMD is associated with [facility]. AJM: Patient received at signout. Still with mild wheezing and coughing. seen by pulm who recommends admission given multiple asthma visits to er during this pregnancy. Pt comfortable with plan.

## 2017-05-31 NOTE — CONSULT NOTE ADULT - SUBJECTIVE AND OBJECTIVE BOX
37y G_P_ presents with   HPI:      OB/GYN HISTORY:   [place]:  Parity:  Term:  :  MAB/TAB/Ectopic:  Living:    Last Menstrual Period    Name of GYN Physician:  Date of Last Pap:  History of Abnormal Pap:  Date of Last Mammogram:  Date of Last Colonoscopy:  Current OCP use:     PAST MEDICAL & SURGICAL HISTORY:  Iron deficiency  Asthma  No significant past surgical history      REVIEW OF SYSTEMS      General:	    Skin/Breast:  	  Ophthalmologic:  	  ENMT:	    Respiratory and Thorax:  	  Cardiovascular:	    Gastrointestinal:	    Genitourinary:	    Musculoskeletal:	    Neurological:	    Psychiatric:	    Hematology/Lymphatics:	    Endocrine:	    Allergic/Immunologic:	    MEDICATIONS  (STANDING):  sodium chloride 0.9%. 1000milliLiter(s) IV Continuous <Continuous>  ALBUTerol/ipratropium for Nebulization 3milliLiter(s) Nebulizer every 4 hours    MEDICATIONS  (PRN):      Allergies    No Known Allergies    Intolerances        SOCIAL HISTORY:    FAMILY HISTORY:  No pertinent family history in first degree relatives      Vital Signs Last 24 Hrs  T(C): 36.7, Max: 36.7 ( @ 11:46)  T(F): 98, Max: 98 ( @ 11:46)  HR: 108 (108 - 128)  BP: 115/45 (106/55 - 122/74)  BP(mean): --  RR: 20 (16 - 20)  SpO2: 98% (98% - 100%)    PHYSICAL EXAM:      Constitutional: alert and oriented x 3    Breasts: no tenderness, no nodules    Respiratory: clear    Cardiovascular: regular rate and rhythm    Gastrointestinal: soft, non tender, + bowel sounds. No hepatosplenomegaly, no palpable masses    Genitourinary: NEFG  Cervix: closed/ long, no CMT  Uterus: normal size, non tender  Adnexa: non tender, no palpable masses    Rectal:     Extremities:    Neurological:    Skin:    Lymph Nodes:        LABS:                        9.3    8.90  )-----------( 261      ( 31 May 2017 18:28 )             28.9         135  |  100  |  8   ----------------------------<  171<H>  3.2<L>   |  18<L>  |  0.73    Ca    8.5      31 May 2017 18:20  Mg     1.8                 RADIOLOGY & ADDITIONAL STUDIES: 36yo  at 29.4w (EDC 17 by early sono) presents with CC of productive cough and SOB for 3 days. Patient has a history of worsening asthma in pregnancy. Initially symptoms began as non productive cough on Monday. Eventually she began to produce Unremarkable hospital course. Patient hemodynamically stable. Medically cleared for discharge sputum then yellow sputum on Tuesday. Patient has had progressively worsening SOB since Tuesday. She was self treating with her son's Albuterol pump by using it twice per day and saline drops for congestion. Patient denies any CP, hemoptysis, N, V, fevers and chills. Deneis diarrhea, changes in bowel habits, urinary symptoms. Denies LOF, VB, and contractions. Endorses good fetal movement. Deneis and PNC complications, sono abnormalities, and abnormal prenatal testing.     Patient deneis any history of hospital admission for asthma although she has at least 1 yearly ED visit for an asthma exacerbation. Patient endorses multiple hospital admissions as a child, though denies any history of ICU admission or intubation. During this pregnancy, patient has had an asthma exacerbation every 2 months. She has no pulmonologist or PCP following her asthma.     POb:  x2, both uncomplicated deliveries, c/b worsening asthma during PNC, denies hospital admissions during those pregnancies. Both babies were in the 6lbs range.     PGyn: Deneis    PMH: Asthma as described above    PSH: ACL repair ()    Meds: Albuterol PRN    All: NKDA    Vital Signs Last 24 Hrs  T(C): 36.7, Max: 36.7 ( @ 11:46)  T(F): 98, Max: 98 ( @ 11:46)  HR: 108 (108 - 128)  BP: 115/45 (106/55 - 122/74)  BP(mean): --  RR: 20 (16 - 20)  SpO2: 98% (98% - 100%)    PHYSICAL EXAM:    Constitutional: alert and oriented x 3, NAD, some labored breathing  Respiratory: wheezes in the right lower lung lobe, otherwise CTA  Cardiovascular: regular rate and rhythm    LABS:    RVP: positive for hMPV                        9.3    8.90  )-----------( 261      ( 31 May 2017 18:28 )             28.9         135  |  100  |  8   ----------------------------<  171<H>  3.2<L>   |  18<L>  |  0.73    Ca    8.5      31 May 2017 18:20  Mg     1.8         RADIOLOGY & ADDITIONAL STUDIES:    CXR:    Upright frontal and lateral chest from 2017 at 1327. No prior chest   x-ray available at this institution for comparison.    Lead apron partially visualized over abdominal region.     IMPRESSION:  Clear lungs. No pleural effusions or pneumothorax.    Cardiac and mediastinal silhouettes within normal limits.    Trachea midline.    Unremarkable osseous structures.    Interval note: Patient has received 4 duonebs since ED presentation and 1 dose of prednisone.

## 2017-05-31 NOTE — ED CDU PROVIDER NOTE - PLAN OF CARE
Use inhaler as directed. Follow up with your PMD and your OBGYN. Rest, drink plenty of fluids.  Advance activity as tolerated.  Continue all previously prescribed medications as directed. You can use motrin 600mg every 6-8 hours for pain or fever, and/or Tylenol 650 mg every 4 hours for pain/fever. Follow up with your primary care physician in 48-72 hours- bring copies of your results.  Return to the emergency department for chest pain, shortness of breath, dizziness, or worsening, concerning, or persistent symptoms.

## 2017-05-31 NOTE — CONSULT NOTE ADULT - ASSESSMENT
A/P: 36yo @ 29.4w with URI caused by hMPV with superimposed asthma exacerbation in the setting of asthma worsened in pregnancy. Will perform NST/BPP evaluation. Recommend pulmonary consultation for further recommendations as well as follow up as an outpatient. Patient denies GDM and abnormal prenatal testing, HgA1c is 6. Discussed with patient and PA in ED to follow up this lab with her outpatient OBGYN.    Cindi, R2 d/w Dr. Mederos

## 2017-06-01 DIAGNOSIS — J45.901 UNSPECIFIED ASTHMA WITH (ACUTE) EXACERBATION: ICD-10-CM

## 2017-06-01 DIAGNOSIS — B97.81 HUMAN METAPNEUMOVIRUS AS THE CAUSE OF DISEASES CLASSIFIED ELSEWHERE: ICD-10-CM

## 2017-06-01 DIAGNOSIS — O99.519 DISEASES OF THE RESPIRATORY SYSTEM COMPLICATING PREGNANCY, UNSPECIFIED TRIMESTER: ICD-10-CM

## 2017-06-01 DIAGNOSIS — B34.9 VIRAL INFECTION, UNSPECIFIED: ICD-10-CM

## 2017-06-01 LAB
BASE EXCESS BLDV CALC-SCNC: -6.9 MMOL/L — SIGNIFICANT CHANGE UP
BLOOD GAS VENOUS - CREATININE: 0.63 MG/DL — SIGNIFICANT CHANGE UP (ref 0.5–1.3)
CHLORIDE BLDV-SCNC: 109 MMOL/L — HIGH (ref 96–108)
GAS PNL BLDV: 134 MMOL/L — LOW (ref 136–146)
GLUCOSE BLDV-MCNC: 138 — HIGH (ref 70–99)
HCO3 BLDV-SCNC: 19 MMOL/L — LOW (ref 20–27)
HCT VFR BLDV CALC: 25.5 % — LOW (ref 34.5–45)
HGB BLDV-MCNC: 8.2 G/DL — LOW (ref 11.5–15.5)
LACTATE BLDV-MCNC: 1.6 MMOL/L — SIGNIFICANT CHANGE UP (ref 0.5–2)
PCO2 BLDV: 28 MMHG — LOW (ref 41–51)
PH BLDV: 7.4 PH — SIGNIFICANT CHANGE UP (ref 7.32–7.43)
PO2 BLDV: 87 MMHG — HIGH (ref 35–40)
POTASSIUM BLDV-SCNC: 3.6 MMOL/L — SIGNIFICANT CHANGE UP (ref 3.4–4.5)
SAO2 % BLDV: 97 % — HIGH (ref 60–85)

## 2017-06-01 PROCEDURE — 99223 1ST HOSP IP/OBS HIGH 75: CPT | Mod: AI

## 2017-06-01 RX ORDER — ONDANSETRON 8 MG/1
4 TABLET, FILM COATED ORAL ONCE
Qty: 0 | Refills: 0 | Status: COMPLETED | OUTPATIENT
Start: 2017-06-01 | End: 2017-06-01

## 2017-06-01 RX ORDER — BUDESONIDE, MICRONIZED 100 %
1 POWDER (GRAM) MISCELLANEOUS
Qty: 0 | Refills: 0 | Status: DISCONTINUED | OUTPATIENT
Start: 2017-06-01 | End: 2017-06-02

## 2017-06-01 RX ORDER — ACETAMINOPHEN 500 MG
650 TABLET ORAL EVERY 6 HOURS
Qty: 0 | Refills: 0 | Status: DISCONTINUED | OUTPATIENT
Start: 2017-06-01 | End: 2017-06-02

## 2017-06-01 RX ADMIN — Medication 3 MILLILITER(S): at 21:34

## 2017-06-01 RX ADMIN — Medication 3 MILLILITER(S): at 06:55

## 2017-06-01 RX ADMIN — Medication 3 MILLILITER(S): at 10:50

## 2017-06-01 RX ADMIN — Medication 1 PUFF(S): at 22:30

## 2017-06-01 RX ADMIN — Medication 3 MILLILITER(S): at 15:19

## 2017-06-01 RX ADMIN — Medication 3 MILLILITER(S): at 04:05

## 2017-06-01 RX ADMIN — ONDANSETRON 4 MILLIGRAM(S): 8 TABLET, FILM COATED ORAL at 16:45

## 2017-06-01 RX ADMIN — ONDANSETRON 4 MILLIGRAM(S): 8 TABLET, FILM COATED ORAL at 01:27

## 2017-06-01 RX ADMIN — Medication 60 MILLIGRAM(S): at 06:55

## 2017-06-01 NOTE — CONSULT NOTE ADULT - ATTENDING COMMENTS
37 year old woman 29 weeks pregnant asthma since age 7 never hospitalized or intubated but has about 1 ED visit a year much more frequent since pregnancy has had 3-4 ED visits this pregnancy not on any control medications. Now entero/rhino positive has had sick contacts.    - admit  - prednisone 20 daily  -start inhaled steroids  - albuterol Q6 ATC  - will follow

## 2017-06-01 NOTE — DISCHARGE NOTE ADULT - PROVIDER TOKENS
FREE:[LAST:[[facility]],PHONE:[phone]],FAX:[(   [phone]    -],ADDRESS:[[phone]]]

## 2017-06-01 NOTE — DISCHARGE NOTE ADULT - PLAN OF CARE
Contniue with prednisone for a total of 5 days and nebulizer Please call and schedule appointment to follow up at [facility] pulmonary clinic as outpatient at [phone] Continue with prednisone for a total of 5 days and nebulizer HgA1c was 6.0 on workup. Please follow up with your OBGYN for further management to rule out gestational diabetes. Continue supportive care. Follow up at [facility] pulmonary clinic as outpatient at [phone] Please call and schedule appointment to follow up at [facility] pulmonary clinic as outpatient at [phone].

## 2017-06-01 NOTE — DISCHARGE NOTE ADULT - PATIENT PORTAL LINK FT
“You can access the Follow[facility] Patient Portal, offered by [facility], by registering with the following website: [email or web address]”

## 2017-06-01 NOTE — DISCHARGE NOTE ADULT - HOSPITAL COURSE
38 yo female with ho asthma, 29 weeks pregnant a/w acute asthma exacerbation due to hMPV.      Asthma exacerbation  -apprec pulm eval  -pred PO  -nebs  -pulmicort  -anticipate d/c in AM.     hMPV  supportive care  robitussin prn  tylenol prn.     Appreciate pulmonary note --> - prednisone 20 mg qd for 5 days   - start ICS - pulmicort inhaled BID   - continue with albuterol q6h nebs standing for today  - will need the nebulizer on discharge [she only has the inhaler at home]   - will need to f/u at the pulmonary clinic as outpt - [phone]    6/1 - will re-evaluate patient tomorrow 36 yo female with ho asthma, 29 weeks pregnant a/w acute asthma exacerbation due to hMPV.      Asthma exacerbation  -apprec pulm eval  -pred PO  -nebs  -pulmicort  -anticipate d/c in AM.     hMPV  supportive care  robitussin prn  tylenol prn.     Appreciate pulmonary note --> - prednisone 20 mg qd for 5 days   - start ICS - pulmicort inhaled BID   - continue with albuterol q6h nebs standing for today  - will need the nebulizer on discharge [she only has the inhaler at home]   - will need to f/u at the pulmonary clinic as outpt - [phone]    Dispo: medically stable for Home

## 2017-06-01 NOTE — CONSULT NOTE ADULT - ASSESSMENT
Ms. Johnson is a 37 year old woman [] - 29.4 wks pregnant with asthma, presents to the ED with 3 days of chest tightness, cough and wheezing, likely with asthma exacerbation in the setting of +hMPV. This is her 4th ED visit for similar episodes. She is not on any maintenance, but will need to be daily inhalers now, at least through the duration of her pregnancy.    Patient does not feel comfortable going home right now and does not feel like she is back to baseline. Agree that she should be observed / admitted for at least one more day.     Recommend:   - prednisone 20 mg qd for 5 days   - start ICS - pulmicort inhaled BID   - continue with albuterol q6h nebs standing for today  - will need the nebulizer on discharge [she only has the inhaler at home]   - will need to f/u at the pulmonary clinic as outpt - [phone]    please f/u attg note later today.     Romero Aceves MD  Pulmonary Fellow

## 2017-06-01 NOTE — DISCHARGE NOTE ADULT - MEDICATION SUMMARY - MEDICATIONS TO TAKE
I will START or STAY ON the medications listed below when I get home from the hospital:    budesonide 90 mcg/inh inhalation powder  -- 1 puff(s) inhaled 2 times a day  -- Indication: For Asthma    predniSONE 20 mg oral tablet  -- 1 tab(s) by mouth once a day  -- Indication: For Asthma exacerbation    Diclegis 10 mg-10 mg oral delayed release tablet  -- 2 tab(s) by mouth once a day (at bedtime)  -- Do not chew, break, or crush.  Do not drink alcoholic beverages when taking this medication.  May cause drowsiness.  Alcohol may intensify this effect.  Use care when operating dangerous machinery.  Some non-prescription drugs may aggravate your condition.  Read all labels carefully.  If a warning appears, check with your doctor before taking.  Swallow whole.  Do not crush.  Take medication on an empty stomach 1 hour before or 2 to 3 hours after a meal unless otherwise directed by your doctor.  This drug may impair the ability to drive or operate machinery.  Use care until you become familiar with its effects.    -- Indication: For Nausea/vomitting     albuterol 0.63 mg/3 mL (0.021%) inhalation solution  -- 3 milliliter(s) inhaled every 6 hours, As Needed -for shortness of breath and/or wheezing  -- For inhalation only.  It is very important that you take or use this exactly as directed.  Do not skip doses or discontinue unless directed by your doctor.  Obtain medical advice before taking any non-prescription drugs as some may affect the action of this medication.    -- Indication: For Asthma    Azithromycin 5 Day Dose Pack 250 mg oral tablet  -- Take Z-Sven as directed  -- Do not take dairy products, antacids, or iron preparations within one hour of this medication.  Finish all this medication unless otherwise directed by prescriber.    -- Indication: For Human metapneumovirus (hMPV) as cause of disease classified elsewhere    Flonase 50 mcg/inh nasal spray  -- 1 spray(s) into nose 2 times a day  -- For the nose.  It is very important that you take or use this exactly as directed.  Do not skip doses or discontinue unless directed by your doctor.    -- Indication: For Viral infection I will START or STAY ON the medications listed below when I get home from the hospital:    budesonide 90 mcg/inh inhalation powder  -- 1 puff(s) inhaled 2 times a day  -- Indication: For Asthma    predniSONE 20 mg oral tablet  -- 1 tab(s) by mouth once a day  -- Indication: For Asthma exacerbation    Diclegis 10 mg-10 mg oral delayed release tablet  -- 2 tab(s) by mouth once a day (at bedtime)  -- Do not chew, break, or crush.  Do not drink alcoholic beverages when taking this medication.  May cause drowsiness.  Alcohol may intensify this effect.  Use care when operating dangerous machinery.  Some non-prescription drugs may aggravate your condition.  Read all labels carefully.  If a warning appears, check with your doctor before taking.  Swallow whole.  Do not crush.  Take medication on an empty stomach 1 hour before or 2 to 3 hours after a meal unless otherwise directed by your doctor.  This drug may impair the ability to drive or operate machinery.  Use care until you become familiar with its effects.    -- Indication: For Nausea/vomitting     albuterol 2.5 mg/3 mL (0.083%) inhalation solution  -- 3 milliliter(s) inhaled every 6 hours, As Needed -for shortness of breath and/or wheezing  -- For inhalation only.  It is very important that you take or use this exactly as directed.  Do not skip doses or discontinue unless directed by your doctor.  Obtain medical advice before taking any non-prescription drugs as some may affect the action of this medication.    -- Indication: For Asthma    Azithromycin 5 Day Dose Pack 250 mg oral tablet  -- Take Z-Sven as directed  -- Do not take dairy products, antacids, or iron preparations within one hour of this medication.  Finish all this medication unless otherwise directed by prescriber.    -- Indication: For Human metapneumovirus (hMPV) as cause of disease classified elsewhere    Flonase 50 mcg/inh nasal spray  -- 1 spray(s) into nose 2 times a day  -- For the nose.  It is very important that you take or use this exactly as directed.  Do not skip doses or discontinue unless directed by your doctor.    -- Indication: For Viral infection I will START or STAY ON the medications listed below when I get home from the hospital:    budesonide 90 mcg/inh inhalation powder  -- 1 puff(s) inhaled 2 times a day  -- Indication: For Asthma    predniSONE 20 mg oral tablet  -- 1 tab(s) by mouth once a day  -- Indication: For Asthma exacerbation    Diclegis 10 mg-10 mg oral delayed release tablet  -- 2 tab(s) by mouth once a day (at bedtime)  -- Do not chew, break, or crush.  Do not drink alcoholic beverages when taking this medication.  May cause drowsiness.  Alcohol may intensify this effect.  Use care when operating dangerous machinery.  Some non-prescription drugs may aggravate your condition.  Read all labels carefully.  If a warning appears, check with your doctor before taking.  Swallow whole.  Do not crush.  Take medication on an empty stomach 1 hour before or 2 to 3 hours after a meal unless otherwise directed by your doctor.  This drug may impair the ability to drive or operate machinery.  Use care until you become familiar with its effects.    -- Indication: For Nausea/vomitting     albuterol 2.5 mg/3 mL (0.083%) inhalation solution  -- 3 milliliter(s) inhaled every 6 hours, As Needed -for shortness of breath and/or wheezing  -- For inhalation only.  It is very important that you take or use this exactly as directed.  Do not skip doses or discontinue unless directed by your doctor.  Obtain medical advice before taking any non-prescription drugs as some may affect the action of this medication.    -- Indication: For Asthma    Flonase 50 mcg/inh nasal spray  -- 1 spray(s) into nose 2 times a day  -- For the nose.  It is very important that you take or use this exactly as directed.  Do not skip doses or discontinue unless directed by your doctor.    -- Indication: For Viral infection

## 2017-06-01 NOTE — CONSULT NOTE ADULT - SUBJECTIVE AND OBJECTIVE BOX
CHIEF COMPLAINT: asthma     HPI: Ms. Johnson is a 37 year old woman [] - 29.4 wks pregnant with asthma, presents to the ED with 3 days of chest tightness, cough and wheezing.     3 d ago, she developed a cough - clear sputum after being exposed to the AC. The cough progressed to chest tightness and wheezing. She got more SOB and decided to come to the ED. No fevers. +sick contact - 2 of her kids at home had colds. No rhinnorhea. +sore throat. No leg swelling.     This is her 3-4th ED visit during this pregnancy for wheezing and SOB. She is only on albuterol PRN and takes her kid's nebulizer as needed. She was dx with asthma as a child - hospitalized many times as a child, but nothing as an adult. Her asthma triggers are +colds and pollen. No carpets or pets at home. During her previous two pregnancies, she has had similar asthma problems. Does not have a pulmonologist.    She received prednisone 60 mg x1 and duonebs x3-4.     PAST MEDICAL & SURGICAL HISTORY:  Iron deficiency  Asthma  No significant past surgical history      FAMILY HISTORY:  No pertinent family history in first degree relatives      SOCIAL HISTORY:  Smoking: never  EtOH Use: none  Marital Status:   Occupation: works in an office    Allergies  No Known Allergies    HOME MEDICATIONS:  albuterol prn   multivitamins    REVIEW OF SYSTEMS:  see HPI    OBJECTIVE:  ICU Vital Signs Last 24 Hrs  T(C): 36.9, Max: 36.9 ( @ 00:50)  T(F): 98.4, Max: 98.5 ( @ 00:50)  HR: 109 (108 - 127)  BP: 116/58 (102/47 - 120/41)  RR: 18 (16 - 20)  SpO2: 98% (96% - 100%) on RA    PHYSICAL EXAM:  General:  lying in bed. comfortable.  Lymph Nodes: no cervical LAD  Respiratory: mild scatter expiratory wheezing. no crackles.   Cardiovascular: nl rate and reg rhythm. nl s1, s2. no m/r/g.  Abdomen: gravid  Extremities: no edema  Skin: no clubbing  Neurological: AOx3    HOSPITAL MEDICATIONS:  MEDICATIONS  (STANDING):  ALBUTerol/ipratropium for Nebulization 3milliLiter(s) Nebulizer every 4 hours    MEDICATIONS  (PRN):      LABS:                        9.3    8.90  )-----------( 261      ( 31 May 2017 18:28 )             28.9         135  |  100  |  8   ----------------------------<  171<H>  3.2<L>   |  18<L>  |  0.73    Ca    8.5      31 May 2017 18:20  Mg     1.8           +RVP for hMPV      Venous Blood Gas:   @ 00:50  7.40/28/87/19/97.0  VBG Lactate: 1.6    RADIOLOGY:  CXR: clear

## 2017-06-01 NOTE — H&P ADULT - HISTORY OF PRESENT ILLNESS
38 yo female 29 weeks pregnant, asthma a/w acute asthma exac due to hMPV infection.  Pt reports inc SOB with sick contact at home.  Pt c/o lat chest aches from coughing.  Overall feels better but concerned to go home today.  Informed pt she will likely have a cough/productive cough for few weeks due to this virus and will slowly get better.  She is agreeable to go home tomorrow.

## 2017-06-01 NOTE — DISCHARGE NOTE ADULT - ADDITIONAL INSTRUCTIONS
Please follow up with your OBGYN in regards to your HgA1c being 6.0. Please follow up with your OBGYN in regards to your HgA1c being 6.0.  Follow up with pulmonologist -

## 2017-06-01 NOTE — DISCHARGE NOTE ADULT - MEDICATION SUMMARY - MEDICATIONS TO STOP TAKING
I will STOP taking the medications listed below when I get home from the hospital:    albuterol  --  by mouth    ProAir HFA 90 mcg/inh inhalation aerosol  -- 2 puff(s) inhaled 4 times a day  -- For inhalation only.  It is very important that you take or use this exactly as directed.  Do not skip doses or discontinue unless directed by your doctor.  Obtain medical advice before taking any non-prescription drugs as some may affect the action of this medication.  Shake well before use.    amoxicillin 500 mg oral capsule  -- 1 cap(s) by mouth 2 times a day  -- Finish all this medication unless otherwise directed by prescriber. I will STOP taking the medications listed below when I get home from the hospital:    albuterol  --  by mouth    ProAir HFA 90 mcg/inh inhalation aerosol  -- 2 puff(s) inhaled 4 times a day  -- For inhalation only.  It is very important that you take or use this exactly as directed.  Do not skip doses or discontinue unless directed by your doctor.  Obtain medical advice before taking any non-prescription drugs as some may affect the action of this medication.  Shake well before use.    amoxicillin 500 mg oral capsule  -- 1 cap(s) by mouth 2 times a day  -- Finish all this medication unless otherwise directed by prescriber.    nebulizer

## 2017-06-01 NOTE — DISCHARGE NOTE ADULT - CARE PLAN
Principal Discharge DX:	Asthma exacerbation  Goal:	Contniue with prednisone for a total of 5 days and nebulizer  Instructions for follow-up, activity and diet:	Please call and schedule appointment to follow up at [facility] pulmonary clinic as outpatient at [phone] Principal Discharge DX:	Asthma exacerbation  Goal:	Continue with prednisone for a total of 5 days and nebulizer  Instructions for follow-up, activity and diet:	Please call and schedule appointment to follow up at [facility] pulmonary clinic as outpatient at [phone] Principal Discharge DX:	Asthma exacerbation  Goal:	Continue with prednisone for a total of 5 days and nebulizer  Instructions for follow-up, activity and diet:	Please call and schedule appointment to follow up at [facility] pulmonary clinic as outpatient at [phone] Principal Discharge DX:	Asthma exacerbation  Goal:	Continue with prednisone for a total of 5 days and nebulizer  Instructions for follow-up, activity and diet:	Please call and schedule appointment to follow up at [facility] pulmonary clinic as outpatient at [phone].  Secondary Diagnosis:	Prediabetes  Instructions for follow-up, activity and diet:	HgA1c was 6.0 on workup. Please follow up with your OBGYN for further management to rule out gestational diabetes.  Secondary Diagnosis:	Human metapneumovirus (hMPV) as cause of disease classified elsewhere  Instructions for follow-up, activity and diet:	Continue supportive care. Follow up at [facility] pulmonary clinic as outpatient at [phone] Principal Discharge DX:	Asthma exacerbation  Goal:	Continue with prednisone for a total of 5 days and nebulizer  Instructions for follow-up, activity and diet:	Please call and schedule appointment to follow up at [facility] pulmonary clinic as outpatient at [phone].  Secondary Diagnosis:	Prediabetes  Instructions for follow-up, activity and diet:	HgA1c was 6.0 on workup. Please follow up with your OBGYN for further management to rule out gestational diabetes.  Secondary Diagnosis:	Human metapneumovirus (hMPV) as cause of disease classified elsewhere  Instructions for follow-up, activity and diet:	Continue supportive care. Follow up at [facility] pulmonary clinic as outpatient at [phone] Principal Discharge DX:	Asthma exacerbation  Goal:	Continue with prednisone for a total of 5 days and nebulizer  Instructions for follow-up, activity and diet:	Please call and schedule appointment to follow up at [facility] pulmonary clinic as outpatient at [phone].  Secondary Diagnosis:	Prediabetes  Instructions for follow-up, activity and diet:	HgA1c was 6.0 on workup. Please follow up with your OBGYN for further management to rule out gestational diabetes.  Secondary Diagnosis:	Human metapneumovirus (hMPV) as cause of disease classified elsewhere  Instructions for follow-up, activity and diet:	Continue supportive care. Follow up at [facility] pulmonary clinic as outpatient at [phone] Principal Discharge DX:	Asthma exacerbation  Goal:	Continue with prednisone for a total of 5 days and nebulizer  Instructions for follow-up, activity and diet:	Please call and schedule appointment to follow up at [facility] pulmonary clinic as outpatient at [phone].  Secondary Diagnosis:	Prediabetes  Instructions for follow-up, activity and diet:	HgA1c was 6.0 on workup. Please follow up with your OBGYN for further management to rule out gestational diabetes.  Secondary Diagnosis:	Human metapneumovirus (hMPV) as cause of disease classified elsewhere  Instructions for follow-up, activity and diet:	Continue supportive care. Follow up at [facility] pulmonary clinic as outpatient at [phone] Principal Discharge DX:	Asthma exacerbation  Goal:	Continue with prednisone for a total of 5 days and nebulizer  Instructions for follow-up, activity and diet:	Please call and schedule appointment to follow up at [facility] pulmonary clinic as outpatient at [phone].  Secondary Diagnosis:	Prediabetes  Instructions for follow-up, activity and diet:	HgA1c was 6.0 on workup. Please follow up with your OBGYN for further management to rule out gestational diabetes.  Secondary Diagnosis:	Human metapneumovirus (hMPV) as cause of disease classified elsewhere  Instructions for follow-up, activity and diet:	Continue supportive care. Follow up at [facility] pulmonary clinic as outpatient at [phone]

## 2017-06-02 VITALS
OXYGEN SATURATION: 98 % | HEART RATE: 97 BPM | TEMPERATURE: 98 F | DIASTOLIC BLOOD PRESSURE: 62 MMHG | SYSTOLIC BLOOD PRESSURE: 103 MMHG | RESPIRATION RATE: 18 BRPM

## 2017-06-02 LAB
ALBUMIN SERPL ELPH-MCNC: 3.1 G/DL — LOW (ref 3.3–5)
ALP SERPL-CCNC: 65 U/L — SIGNIFICANT CHANGE UP (ref 40–120)
ALT FLD-CCNC: 12 U/L — SIGNIFICANT CHANGE UP (ref 4–33)
AST SERPL-CCNC: 26 U/L — SIGNIFICANT CHANGE UP (ref 4–32)
BASOPHILS # BLD AUTO: 0.02 K/UL — SIGNIFICANT CHANGE UP (ref 0–0.2)
BASOPHILS NFR BLD AUTO: 0.2 % — SIGNIFICANT CHANGE UP (ref 0–2)
BILIRUB SERPL-MCNC: 0.2 MG/DL — SIGNIFICANT CHANGE UP (ref 0.2–1.2)
BUN SERPL-MCNC: 10 MG/DL — SIGNIFICANT CHANGE UP (ref 7–23)
CALCIUM SERPL-MCNC: 8.5 MG/DL — SIGNIFICANT CHANGE UP (ref 8.4–10.5)
CHLORIDE SERPL-SCNC: 102 MMOL/L — SIGNIFICANT CHANGE UP (ref 98–107)
CO2 SERPL-SCNC: 18 MMOL/L — LOW (ref 22–31)
CREAT SERPL-MCNC: 0.71 MG/DL — SIGNIFICANT CHANGE UP (ref 0.5–1.3)
EOSINOPHIL # BLD AUTO: 0.01 K/UL — SIGNIFICANT CHANGE UP (ref 0–0.5)
EOSINOPHIL NFR BLD AUTO: 0.1 % — SIGNIFICANT CHANGE UP (ref 0–6)
GLUCOSE SERPL-MCNC: 109 MG/DL — HIGH (ref 70–99)
HCT VFR BLD CALC: 26.9 % — LOW (ref 34.5–45)
HGB BLD-MCNC: 8.4 G/DL — LOW (ref 11.5–15.5)
IMM GRANULOCYTES NFR BLD AUTO: 1 % — SIGNIFICANT CHANGE UP (ref 0–1.5)
LYMPHOCYTES # BLD AUTO: 1.24 K/UL — SIGNIFICANT CHANGE UP (ref 1–3.3)
LYMPHOCYTES # BLD AUTO: 15 % — SIGNIFICANT CHANGE UP (ref 13–44)
MCHC RBC-ENTMCNC: 26.6 PG — LOW (ref 27–34)
MCHC RBC-ENTMCNC: 31.2 % — LOW (ref 32–36)
MCV RBC AUTO: 85.1 FL — SIGNIFICANT CHANGE UP (ref 80–100)
MONOCYTES # BLD AUTO: 0.69 K/UL — SIGNIFICANT CHANGE UP (ref 0–0.9)
MONOCYTES NFR BLD AUTO: 8.3 % — SIGNIFICANT CHANGE UP (ref 2–14)
NEUTROPHILS # BLD AUTO: 6.25 K/UL — SIGNIFICANT CHANGE UP (ref 1.8–7.4)
NEUTROPHILS NFR BLD AUTO: 75.4 % — SIGNIFICANT CHANGE UP (ref 43–77)
PLATELET # BLD AUTO: 260 K/UL — SIGNIFICANT CHANGE UP (ref 150–400)
PMV BLD: 9.4 FL — SIGNIFICANT CHANGE UP (ref 7–13)
POTASSIUM SERPL-MCNC: 3.5 MMOL/L — SIGNIFICANT CHANGE UP (ref 3.5–5.3)
POTASSIUM SERPL-SCNC: 3.5 MMOL/L — SIGNIFICANT CHANGE UP (ref 3.5–5.3)
PROT SERPL-MCNC: 6.5 G/DL — SIGNIFICANT CHANGE UP (ref 6–8.3)
RBC # BLD: 3.16 M/UL — LOW (ref 3.8–5.2)
RBC # FLD: 16.3 % — HIGH (ref 10.3–14.5)
SODIUM SERPL-SCNC: 137 MMOL/L — SIGNIFICANT CHANGE UP (ref 135–145)
WBC # BLD: 8.29 K/UL — SIGNIFICANT CHANGE UP (ref 3.8–10.5)
WBC # FLD AUTO: 8.29 K/UL — SIGNIFICANT CHANGE UP (ref 3.8–10.5)

## 2017-06-02 RX ORDER — ALBUTEROL 90 UG/1
3 AEROSOL, METERED ORAL
Qty: 1 | Refills: 0 | OUTPATIENT
Start: 2017-06-02 | End: 2017-07-02

## 2017-06-02 RX ORDER — LEVALBUTEROL 1.25 MG/.5ML
0.63 SOLUTION, CONCENTRATE RESPIRATORY (INHALATION) EVERY 6 HOURS
Qty: 0 | Refills: 0 | Status: DISCONTINUED | OUTPATIENT
Start: 2017-06-02 | End: 2017-06-02

## 2017-06-02 RX ORDER — FLUTICASONE PROPIONATE 50 MCG
1 SPRAY, SUSPENSION NASAL
Qty: 1 | Refills: 0 | OUTPATIENT
Start: 2017-06-02 | End: 2017-06-09

## 2017-06-02 RX ORDER — ONDANSETRON 8 MG/1
4 TABLET, FILM COATED ORAL ONCE
Qty: 0 | Refills: 0 | Status: COMPLETED | OUTPATIENT
Start: 2017-06-02 | End: 2017-06-02

## 2017-06-02 RX ORDER — AZITHROMYCIN 500 MG/1
1 TABLET, FILM COATED ORAL
Qty: 1 | Refills: 0 | OUTPATIENT
Start: 2017-06-02

## 2017-06-02 RX ORDER — BUDESONIDE, MICRONIZED 100 %
1 POWDER (GRAM) MISCELLANEOUS
Qty: 1 | Refills: 0 | OUTPATIENT
Start: 2017-06-02 | End: 2017-07-02

## 2017-06-02 RX ORDER — ALBUTEROL 90 UG/1
3 AEROSOL, METERED ORAL
Qty: 1 | Refills: 0 | OUTPATIENT
Start: 2017-06-02

## 2017-06-02 RX ADMIN — Medication 200 MILLIGRAM(S): at 11:06

## 2017-06-02 RX ADMIN — Medication 1 PUFF(S): at 11:07

## 2017-06-02 RX ADMIN — Medication 3 MILLILITER(S): at 04:59

## 2017-06-02 RX ADMIN — LEVALBUTEROL 0.63 MILLIGRAM(S): 1.25 SOLUTION, CONCENTRATE RESPIRATORY (INHALATION) at 11:12

## 2017-06-02 RX ADMIN — Medication 3 MILLILITER(S): at 00:33

## 2017-06-02 RX ADMIN — Medication 20 MILLIGRAM(S): at 05:02

## 2017-06-02 RX ADMIN — Medication 200 MILLIGRAM(S): at 04:26

## 2017-06-02 RX ADMIN — ONDANSETRON 4 MILLIGRAM(S): 8 TABLET, FILM COATED ORAL at 00:29

## 2017-06-02 NOTE — PROGRESS NOTE ADULT - PROBLEM SELECTOR PLAN 1
1) Asthma Exacerbation  - Improving  - O2 sats stable on room air  - Continue Albuterol, Prednisone and Pulmicort.    - Robitussin PRN  - Pt plans to follow up with Pulmonologist     2) Maternal Well Being  - Regular diet  - Monitor I/Os and VS  - DVT Prophylaxis: ambulation     3) Fetal Well Being  - Daily PNV  - Daily NST   - Follow up with [facility] in [place] in 1 week    4) Appeciate Internal Medicine care  Mt, PGY3

## 2017-06-02 NOTE — CONSULT NOTE ADULT - SUBJECTIVE AND OBJECTIVE BOX
Patient is a 37y old  Female who presents with a chief complaint of SOB, cough (01 Jun 2017 18:09)      HPI:  36 yo female 29 weeks pregnant, asthma a/w acute asthma exac due to hMPV infection.  Pt reports inc SOB with sick contact at home her children.  Pt c/o lat chest aches from coughing.  Overall feels better but concerned to go home today.  Informed pt she will likely have a cough/productive cough for few weeks due to this virus and will slowly get better.  She is agreeable to go home tomorrow. (01 Jun 2017 14:17) She denies any abdominal pain, nausea, vomiting, diarrhea, fever, chills at present. She denies any recent travel. She denies any headache, focal limb weakness, seizure like activity or blurred vision. She denies any leg pain or swelling. She denies any depression, suicidal or homicidal ideations. She denies any vaginal discharge or bleeding. She denies any other complaints at present. She stats that she feels much better than yesterday more than 60 to70 percent improvement and wants to go home.      PAST MEDICAL & SURGICAL HISTORY:  Iron deficiency  Asthma bronchial ( NI, NSD)  No significant past surgical history      Allergies    No Known Allergies    Intolerances        FAMILY HISTORY:  No pertinent family history in first degree relatives      SOCIAL HISTORY:  Negative for SMoking, ETOH, Drugs    MEDICATIONS  (STANDING):  predniSONE   Tablet 20milliGRAM(s) Oral daily  buDESOnide   90 MICROgram(s) Inhaler 1Puff(s) Inhalation two times a day    MEDICATIONS  (PRN):  acetaminophen   Tablet. 650milliGRAM(s) Oral every 6 hours PRN mild and mod and severe pain  guaiFENesin    Syrup 200milliGRAM(s) Oral every 6 hours PRN Cough  levalbuterol Inhalation 0.63milliGRAM(s) Inhalation every 6 hours PRN SOB/Wheezing      REVIEW OF SYSTEMS:    CONSTITUTIONAL:Low grade fever, fatigue, lethagy, denies any weight loss  EYES: No eye pain, visual disturbances, or discharge  ENMT:  No difficulty hearing, tinnitus, vertigo; No sinus or throat pain  NECK: No pain or stiffness  BREASTS: No pain, masses, or nipple discharge  RESPIRATORY: Cough, shortness of breath, wheezing, chest tightness, Denies any hemoptysis  CARDIOVASCULAR: No chest pain, palpitations, dizziness, or leg swelling, syncope, fall  GASTROINTESTINAL: No abdominal or epigastric pain. No nausea, vomiting, or hematemesis; No diarrhea or constipation. No melena or hematochezia.  GENITOURINARY: No dysuria, frequency, hematuria, or incontinence  NEUROLOGICAL: No headaches, memory loss, loss of strength, numbness, or tremors  SKIN: No itching, burning, rashes, or lesions   LYMPH NODES: No enlarged glands  ENDOCRINE: No heat or cold intolerance; No hair loss  MUSCULOSKELETAL: No joint pain or swelling; No muscle, back, or extremity pain  PSYCHIATRIC: No depression, anxiety, mood swings, or difficulty sleeping  HEME/LYMPH: No easy bruising, or bleeding gums  ALLERY AND IMMUNOLOGIC: No hives or eczema    Vital Signs Last 24 Hrs  T(C): 36.7, Max: 36.9 (06-02 @ 05:00)  HR: 97 (97 - 124)  BP: 103/62 (103/62 - 133/81)  RR: 18 (18 - 20)  SpO2: 98% (97% - 100%)  Wt(kg): --    CAPILLARY BLOOD GLUCOSE      I&O's Summary      PHYSICAL EXAM:  GENERAL: NAD, well-developed, well nourished, alert, awake, no acute distress at present  HEAD:  Atraumatic, Normocephalic,   EYES: EOMI, PERRLA, conjunctiva and sclera clear  NECK: Supple, No JVD, no palpable thyromegaly, no lymph adenopahy  CHEST/LUNG: Bilateral decreased breath sounds, Bibasilar rhonchi and minimal expiratory wheezing, No rales or crepitations  HEART: Regular rate and rhythm; No murmurs, rubs, or gallops  ABDOMEN: Soft, Nontender, Nondistended; Bowel sounds present, no guarding, no rigidity, no rebound tenderness  EXTREMITIES:  2+ Peripheral Pulses, No clubbing, cyanosis, or edema, no calf tenderness  Neurology: AAOx3, no focal neurological deficit. Motor 5/5 all 4 extremities. sensory intact. cranial nerves II to XII grossly intact. Able to comprehend and answers all questions appropriately.  SKIN: No rashes or lesions    LABS:                                                   06-02-17 @ 06:15    137  |  102  |  10  ----------------------------<  109<H>  3.5   |  18<L>  |  0.71                                                 05-31-17 @ 18:20    135  |  100  |  8   ----------------------------<  171<H>  3.2<L>   |  18<L>  |  0.73    Ca    8.5      02 Jun 2017 06:15  Ca    8.5      31 May 2017 18:20  Mg     1.8     05-31    TPro  6.5  /  Alb  3.1<L>  /  TBili  0.2  /  DBili  x   /  AST  26  /  ALT  12  /  AlkPhos  65  06-02      CBC Full  -  ( 02 Jun 2017 06:15 )  WBC Count : 8.29 K/uL  Hemoglobin : 8.4 g/dL  Hematocrit : 26.9 %  Platelet Count - Automated : 260 K/uL  Mean Cell Volume : 85.1 fL      CBC Full  -  ( 31 May 2017 18:28 )  WBC Count : 8.90 K/uL  Hemoglobin : 9.3 g/dL  Hematocrit : 28.9 %  Platelet Count - Automated : 261 K/uL  Mean Cell Volume : 84.0 fL                  RADIOLOGY & ADDITIONAL TESTS:    EXAM:  RAD CHEST PA LAT        PROCEDURE DATE:  May 31 2017         INTERPRETATION:  CLINICAL INDICATION: asthma; wheezing; cough; dyspnea    EXAM:  Upright frontal and lateral chest from 5/31/2017 at 1327. No prior chest   x-ray available at this institution for comparison.    Lead apron partially visualized over abdominal region.     IMPRESSION:  Clear lungs. No pleural effusions or pneumothorax.    Cardiac and mediastinal silhouettes within normal limits.    Trachea midline.    Unremarkable osseous structures.                  JACE NAVA M.D., ATTENDING RADIOLOGIST  This document has been electronically signed. May 31 2017  4:04PM                    Imaging Personally Reviewed: by Dr. Norman Aceves    Consultant(s) Notes Reviewed in details and plan of care  Discussed with Consultants/Other Providers.

## 2017-06-02 NOTE — CONSULT NOTE ADULT - ASSESSMENT
36 yo female 29 weeks pregnant, asthma a/w acute asthma exac due to hMPV infection.  No evidence for superimposed bacterial pna on chest xray.  No fevers or leukocytosis suggestive of sepsis syndrome.    -Would hold off on antibiotics at this time.  -Continue supportive care including, steroid taper, duonebs, anti-tussives as needed.  -Stable from ID standpoint for discharge home.

## 2017-06-02 NOTE — CONSULT NOTE ADULT - ASSESSMENT
37 year female    Acute Bronchial asthma exacerbation  Acute human metapneumoviral infection  H/o recent Sick contact  Iron deficiency anemia  GERD    Plan of care:  IV/po hydration  IV steroids to po  Contact/Droplet precuation  Optimal medical treatment  Cough suppressants  Inhaled steroids  Bronchodilator therapy  No antibiotics at present due to viral illness  Advised to wear mask.    Plan of care discuss  with patient in details.  I have personally explained the risk, benefits and alternative plan of care in details. I have answered all the questions appropriately to patient and family satisfaction.     Appropriate clinical and explanation time spent with patient, review charts, consults, Laboratory data and Imaging studies and derived the plan of care.

## 2017-06-02 NOTE — PROGRESS NOTE ADULT - SUBJECTIVE AND OBJECTIVE BOX
S:        O: VS: T(C): 36.9, Max: 37.1 (06-01 @ 14:35)  HR: 107 (99 - 124)  BP: 133/81 (103/48 - 133/81)  RR: 19 (16 - 20)  SpO2: 97% (97% - 100%)  Wt(kg): --  I/Os:  Gen:  CV:  Pulm:  Abd:  Ext:    Meds: predniSONE   Tablet 20milliGRAM(s) Oral daily  buDESOnide   90 MICROgram(s) Inhaler 1Puff(s) Inhalation two times a day  acetaminophen   Tablet. 650milliGRAM(s) Oral every 6 hours PRN  guaiFENesin    Syrup 200milliGRAM(s) Oral every 6 hours PRN  levalbuterol Inhalation 0.63milliGRAM(s) Inhalation every 6 hours PRN      Labs:    Blood type:   Rubella IgG: RPR:                           8.4<L>   8.29 >-----------< 260    ( 06-02 @ 06:15 )             26.9<L>                        9.3<L>   8.90 >-----------< 261    ( 05-31 @ 18:28 )             28.9<L>    06-02-17 @ 06:15      137  |  102  |  10  ----------------------------<  109<H>  3.5   |  18<L>  |  0.71    05-31-17 @ 18:20      135  |  100  |  8   ----------------------------<  171<H>  3.2<L>   |  18<L>  |  0.73        Ca    8.5      02 Jun 2017 06:15  Ca    8.5      31 May 2017 18:20  Mg     1.8     05-31    TPro  6.5  /  Alb  3.1<L>  /  TBili  0.2  /  DBili  x   /  AST  26  /  ALT  12  /  AlkPhos  65  06-02-17 @ 06:15 S:  Patient reports improvement in symptoms.  Denies any fevers, chills, night sweats, SOB, chest pain, palpitations.  +cough which improves with Robitussin.  Denies any contractions, leakage of fluid, vaginal bleeding.  Reports good fetal movement.  Tolerating regular diet.  +OOB.  +voiding.        O: VS: T(C): 36.9, Max: 37.1 (06-01 @ 14:35)  HR: 107 (99 - 124)  BP: 133/81 (103/48 - 133/81)  RR: 19 (16 - 20)  SpO2: 97% (97% - 100%)    Gen: NAD. AAOx3  CV: RRR  Pulm: CTAB.  No wheezes appreciated.  Abd: Gravid, soft, nontender to palpation.  +bowel sounds  Ext: NT bilaterally    Meds: predniSONE   Tablet 20milliGRAM(s) Oral daily  buDESOnide   90 MICROgram(s) Inhaler 1Puff(s) Inhalation two times a day  acetaminophen   Tablet. 650milliGRAM(s) Oral every 6 hours PRN  guaiFENesin    Syrup 200milliGRAM(s) Oral every 6 hours PRN  levalbuterol Inhalation 0.63milliGRAM(s) Inhalation every 6 hours PRN      Labs:    Blood type:   Rubella IgG: RPR:                           8.4<L>   8.29 >-----------< 260    ( 06-02 @ 06:15 )             26.9<L>                        9.3<L>   8.90 >-----------< 261    ( 05-31 @ 18:28 )             28.9<L>    06-02-17 @ 06:15      137  |  102  |  10  ----------------------------<  109<H>  3.5   |  18<L>  |  0.71    05-31-17 @ 18:20      135  |  100  |  8   ----------------------------<  171<H>  3.2<L>   |  18<L>  |  0.73        Ca    8.5      02 Jun 2017 06:15  Ca    8.5      31 May 2017 18:20  Mg     1.8     05-31    TPro  6.5  /  Alb  3.1<L>  /  TBili  0.2  /  DBili  x   /  AST  26  /  ALT  12  /  AlkPhos  65  06-02-17 @ 06:15

## 2017-06-02 NOTE — PROGRESS NOTE ADULT - ATTENDING COMMENTS
37 year old woman 29 weeks pregnant with asthma exacerbation RVP positive for meta pneumovirus symptomatically much improved    azithromycin for 5 days for bronchitis  prednisone for a total of 5 days  Pulmicort inhaler for asthma control  albuterol prn   follow up in pulmonary clinic in 2 weeks patient has been given phone number to clinic.

## 2017-06-02 NOTE — PROGRESS NOTE ADULT - SUBJECTIVE AND OBJECTIVE BOX
CHIEF COMPLAINT: asthma     Interval Events: feels better this AM. the cough suppressant helped her.  she feels comfortable going back home now.     OBJECTIVE:  ICU Vital Signs Last 24 Hrs  T(C): 36.9, Max: 37.1 (06-01 @ 14:35)  T(F): 98.4, Max: 98.7 (06-01 @ 14:35)  HR: 115 (99 - 124)  BP: 133/81 (103/48 - 133/81)  BP(mean): 68 (68 - 68)  RR: 19 (16 - 20)  SpO2: 98% (97% - 100%)    PHYSICAL EXAM:  General: NAD. resting comfortably in bed.   Respiratory: CTA b/l. good aeration. no wheeezing today.  Cardiovascular: nl rate and reg rhythm nl s1, s2. no m/r/g  Abdomen: gravid   Extremities: no edema    HOSPITAL MEDICATIONS:  MEDICATIONS  (STANDING):  predniSONE   Tablet 20milliGRAM(s) Oral daily  buDESOnide   90 MICROgram(s) Inhaler 1Puff(s) Inhalation two times a day    MEDICATIONS  (PRN):  acetaminophen   Tablet. 650milliGRAM(s) Oral every 6 hours PRN mild and mod and severe pain  guaiFENesin    Syrup 200milliGRAM(s) Oral every 6 hours PRN Cough  levalbuterol Inhalation 0.63milliGRAM(s) Inhalation every 6 hours PRN SOB/Wheezing      LABS:                        8.4    8.29  )-----------( 260      ( 02 Jun 2017 06:15 )             26.9     06-02    137  |  102  |  10  ----------------------------<  109<H>  3.5   |  18<L>  |  0.71    Ca    8.5      02 Jun 2017 06:15  Mg     1.8     05-31    TPro  6.5  /  Alb  3.1<L>  /  TBili  0.2  /  DBili  x   /  AST  26  /  ALT  12  /  AlkPhos  65  06-02          Venous Blood Gas:  06-01 @ 00:50  7.40/28/87/19/97.0  VBG Lactate: 1.6

## 2017-06-02 NOTE — CONSULT NOTE ADULT - SUBJECTIVE AND OBJECTIVE BOX
Patient is a 37y old  Female who presents with a chief complaint of SOB, cough (01 Jun 2017 18:09)      HPI:  36 yo female 29 weeks pregnant, asthma a/w acute asthma exac due to hMPV infection.  Pt reports inc SOB with sick contact at home.  Pt c/o lat chest aches from coughing.  Overall feels better but concerned to go home today.  Informed pt she will likely have a cough/productive cough for few weeks due to this virus and will slowly get better.  She is agreeable to go home tomorrow. (01 Jun 2017 14:17)    She denies fevers, chills, purulent sputum, SOB, FINLEY, wheezing, chest pain.      REVIEW OF SYSTEMS:    CONSTITUTIONAL: No fever, weight loss, or fatigue  EYES: No eye pain, visual disturbances, or discharge  ENMT:  No sore throat  NECK: No pain or stiffness  RESPIRATORY: No cough, wheezing, chills or hemoptysis; No shortness of breath  CARDIOVASCULAR: No chest pain, palpitations, dizziness, or leg swelling  GASTROINTESTINAL: No abdominal or epigastric pain. No nausea, vomiting, or hematemesis; No diarrhea or constipation. No melena or hematochezia.  GENITOURINARY: No dysuria, frequency, hematuria, or incontinence  NEUROLOGICAL: No headaches, memory loss, loss of strength, numbness, or tremors  SKIN: No itching, burning, rashes, or lesions   LYMPH NODES: No enlarged glands  MUSCULOSKELETAL: No joint pain or swelling; No muscle, back, or extremity pain      PAST MEDICAL & SURGICAL HISTORY:  Iron deficiency  Asthma  No significant past surgical history      Allergies    No Known Allergies    Intolerances        FAMILY HISTORY:  No pertinent family history in first degree relatives      SOCIAL HISTORY:  No smoking, IVDU, etoh.  No recent travel      MEDICATIONS  (STANDING):  predniSONE   Tablet 20milliGRAM(s) Oral daily  buDESOnide   90 MICROgram(s) Inhaler 1Puff(s) Inhalation two times a day    MEDICATIONS  (PRN):  acetaminophen   Tablet. 650milliGRAM(s) Oral every 6 hours PRN mild and mod and severe pain  guaiFENesin    Syrup 200milliGRAM(s) Oral every 6 hours PRN Cough  levalbuterol Inhalation 0.63milliGRAM(s) Inhalation every 6 hours PRN SOB/Wheezing      Vital Signs Last 24 Hrs  T(C): 36.7, Max: 36.9 (06-02 @ 05:00)  T(F): 98, Max: 98.4 (06-02 @ 05:00)  HR: 97 (97 - 124)  BP: 103/62 (103/62 - 133/81)  BP(mean): --  RR: 18 (18 - 20)  SpO2: 98% (97% - 100%)    PHYSICAL EXAM:    GENERAL: NAD, well-groomed, well-developed  HEAD:  Atraumatic, Normocephalic  EYES: EOMI, PERRLA, conjunctiva and sclera clear  ENMT: No tonsillar erythema, exudates, or enlargement; Moist mucous membranes, Good dentition, No lesions  NECK: Supple, No JVD, Normal thyroid  NERVOUS SYSTEM:  Alert & Oriented X3, Good concentration; Motor Strength 5/5 B/L upper and lower extremities; DTRs 2+ intact and symmetric  CHEST/LUNG: Clear to percussion bilaterally; No rales, rhonchi, wheezing, or rubs  HEART: Regular rate and rhythm; No murmurs, rubs, or gallops  ABDOMEN: Soft, Nontender, Nondistended; Bowel sounds present  EXTREMITIES:  2+ Peripheral Pulses, No clubbing, cyanosis, or edema  LYMPH: No lymphadenopathy noted  SKIN: No rashes or lesions    LABS:  CBC Full  -  ( 02 Jun 2017 06:15 )  WBC Count : 8.29 K/uL  Hemoglobin : 8.4 g/dL  Hematocrit : 26.9 %  Platelet Count - Automated : 260 K/uL  Mean Cell Volume : 85.1 fL  Mean Cell Hemoglobin : 26.6 pg  Mean Cell Hemoglobin Concentration : 31.2 %  Auto Neutrophil # : 6.25 K/uL  Auto Lymphocyte # : 1.24 K/uL  Auto Monocyte # : 0.69 K/uL  Auto Eosinophil # : 0.01 K/uL  Auto Basophil # : 0.02 K/uL  Auto Neutrophil % : 75.4 %  Auto Lymphocyte % : 15.0 %  Auto Monocyte % : 8.3 %  Auto Eosinophil % : 0.1 %  Auto Basophil % : 0.2 %      06-02    137  |  102  |  10  ----------------------------<  109<H>  3.5   |  18<L>  |  0.71    Ca    8.5      02 Jun 2017 06:15  Mg     1.8     05-31    TPro  6.5  /  Alb  3.1<L>  /  TBili  0.2  /  DBili  x   /  AST  26  /  ALT  12  /  AlkPhos  65  06-02      LIVER FUNCTIONS - ( 02 Jun 2017 06:15 )  Alb: 3.1 g/dL / Pro: 6.5 g/dL / ALK PHOS: 65 u/L / ALT: 12 u/L / AST: 26 u/L / GGT: x                 MICROBIOLOGY:    Blood Cultures:      Urine Cultures:                RADIOLOGY:    EXAM:  RAD CHEST PA LAT        PROCEDURE DATE:  May 31 2017         INTERPRETATION:  CLINICAL INDICATION: asthma; wheezing; cough; dyspnea    EXAM:  Upright frontal and lateral chest from 5/31/2017 at 1327. No prior chest   x-ray available at this institution for comparison.    Lead apron partially visualized over abdominal region.     IMPRESSION:  Clear lungs. No pleural effusions or pneumothorax.    Cardiac and mediastinal silhouettes within normal limits.    Trachea midline.    Unremarkable osseous structures.

## 2017-06-06 PROBLEM — E61.1 IRON DEFICIENCY: Chronic | Status: ACTIVE | Noted: 2017-05-31

## 2017-06-06 PROBLEM — J45.909 UNSPECIFIED ASTHMA, UNCOMPLICATED: Chronic | Status: ACTIVE | Noted: 2017-05-31

## 2017-06-27 ENCOUNTER — APPOINTMENT (OUTPATIENT)
Dept: PULMONOLOGY | Facility: CLINIC | Age: 37
End: 2017-06-27

## 2017-06-27 VITALS
OXYGEN SATURATION: 98 % | HEART RATE: 86 BPM | WEIGHT: 173 LBS | DIASTOLIC BLOOD PRESSURE: 70 MMHG | SYSTOLIC BLOOD PRESSURE: 110 MMHG | RESPIRATION RATE: 18 BRPM | TEMPERATURE: 97.2 F

## 2017-06-27 DIAGNOSIS — J45.909 UNSPECIFIED ASTHMA, UNCOMPLICATED: ICD-10-CM

## 2017-06-27 RX ORDER — BUDESONIDE 90 UG/1
90 AEROSOL, POWDER RESPIRATORY (INHALATION) TWICE DAILY
Qty: 1 | Refills: 5 | Status: ACTIVE | COMMUNITY
Start: 2017-06-27 | End: 1900-01-01

## 2017-06-27 RX ORDER — BUDESONIDE 90 UG/1
90 AEROSOL, POWDER RESPIRATORY (INHALATION)
Refills: 0 | Status: ACTIVE | COMMUNITY

## 2017-06-27 RX ORDER — ALBUTEROL SULFATE 90 UG/1
108 (90 BASE) AEROSOL, METERED RESPIRATORY (INHALATION)
Refills: 0 | Status: ACTIVE | COMMUNITY

## 2018-10-16 NOTE — ED CDU PROVIDER NOTE - GASTROINTESTINAL NEGATIVE STATEMENT, MLM
Detail Level: Generalized Detail Level: Zone no abdominal pain, no bloating, no constipation, no diarrhea, no nausea and no vomiting.

## 2018-12-10 ENCOUNTER — EMERGENCY (EMERGENCY)
Facility: HOSPITAL | Age: 38
LOS: 0 days | Discharge: HOME | End: 2018-12-11
Attending: EMERGENCY MEDICINE
Payer: COMMERCIAL

## 2018-12-10 DIAGNOSIS — J45.901 UNSPECIFIED ASTHMA WITH (ACUTE) EXACERBATION: ICD-10-CM

## 2018-12-10 DIAGNOSIS — E61.1 IRON DEFICIENCY: ICD-10-CM

## 2018-12-10 PROCEDURE — 99284 EMERGENCY DEPT VISIT MOD MDM: CPT | Mod: 25

## 2018-12-11 VITALS
HEIGHT: 69 IN | HEART RATE: 87 BPM | OXYGEN SATURATION: 100 % | DIASTOLIC BLOOD PRESSURE: 81 MMHG | SYSTOLIC BLOOD PRESSURE: 130 MMHG | RESPIRATION RATE: 18 BRPM | WEIGHT: 160.06 LBS | TEMPERATURE: 98 F

## 2018-12-11 VITALS
DIASTOLIC BLOOD PRESSURE: 68 MMHG | SYSTOLIC BLOOD PRESSURE: 131 MMHG | RESPIRATION RATE: 17 BRPM | HEART RATE: 84 BPM | OXYGEN SATURATION: 99 %

## 2018-12-11 LAB
FLUAV SPEC QL CULT: NEGATIVE — SIGNIFICANT CHANGE UP
FLUBV AG SPEC QL IA: NEGATIVE — SIGNIFICANT CHANGE UP

## 2018-12-11 PROCEDURE — 71046 X-RAY EXAM CHEST 2 VIEWS: CPT | Mod: 26

## 2018-12-11 RX ORDER — ALBUTEROL 90 UG/1
2 AEROSOL, METERED ORAL
Qty: 1 | Refills: 0 | OUTPATIENT
Start: 2018-12-11 | End: 2019-01-09

## 2018-12-11 RX ORDER — IPRATROPIUM/ALBUTEROL SULFATE 18-103MCG
3 AEROSOL WITH ADAPTER (GRAM) INHALATION ONCE
Qty: 0 | Refills: 0 | Status: COMPLETED | OUTPATIENT
Start: 2018-12-11 | End: 2018-12-11

## 2018-12-11 RX ORDER — ALBUTEROL 90 UG/1
3 AEROSOL, METERED ORAL
Qty: 540 | Refills: 0 | OUTPATIENT
Start: 2018-12-11 | End: 2019-01-09

## 2018-12-11 RX ADMIN — Medication 60 MILLIGRAM(S): at 02:30

## 2018-12-11 RX ADMIN — Medication 3 MILLILITER(S): at 02:29

## 2018-12-11 RX ADMIN — Medication 200 MILLIGRAM(S): at 02:54

## 2018-12-11 RX ADMIN — Medication 3 MILLILITER(S): at 02:54

## 2018-12-11 NOTE — ED ADULT NURSE NOTE - NSIMPLEMENTINTERV_GEN_ALL_ED
Implemented All Universal Safety Interventions:  [place] to call system. Call bell, personal items and telephone within reach. Instruct patient to call for assistance. Room bathroom lighting operational. Non-slip footwear when patient is off stretcher. Physically safe environment: no spills, clutter or unnecessary equipment. Stretcher in lowest position, wheels locked, appropriate side rails in place.

## 2018-12-11 NOTE — ED ADULT NURSE NOTE - CHIEF COMPLAINT QUOTE
pt c/o asthma since yesterday.  pt talking in full sentences.  no wheezing heard on ascultation in triage.  pt also c/o cough with yellow phlegm since Saturday

## 2018-12-11 NOTE — ED PROVIDER NOTE - OBJECTIVE STATEMENT
37yo female with pmh asthma (no intubations) presents with cough with yellow sputum, sob x few days. Pt also feels sore throat, ears clogged. no fever.     ROS: No fever/chills. No photophobia/eye pain/changes in vision, No ear pain/+sore throat/ no dysphagia, No chest pain/palpitations. + SOB/cough, no stridor. No abdominal pain, N/V/D, no black/bloody bm. No dysuria/frequency/discharge, No headache. No Dizziness.  No rash.  No numbness/tingling/weakness.

## 2018-12-11 NOTE — ED PROVIDER NOTE - PHYSICAL EXAMINATION
Gen: Alert, Well appearing. NAD    Head: NC, AT, PERRL, EOMI, normal lids/conjunctiva   ENT: Bilateral TM WNL, normal hearing, patent oropharynx without erythema/exudate, uvula midline  Neck: supple, no tenderness/meningismus/JVD   Pulm: good ae, scant wheeze  CV: RRR, no M/R/G, +dist pulses   Abd: soft, NT/ND, +BS, no guarding/rebound tenderness  Mskel: no edema/erythema/cyanosis   Skin: no rash   Neuro: AAOx3, no sensory/motor deficits, CN 2-12 intact

## 2018-12-11 NOTE — ED ADULT TRIAGE NOTE - CHIEF COMPLAINT QUOTE
pt c/o asthma since yesterday.  pt talking in full sentences.  no wheezing heard on ascultation in triage. pt c/o asthma since yesterday.  pt talking in full sentences.  no wheezing heard on ascultation in triage.  pt also c/o cough with yellow phlegm since Saturday

## 2018-12-11 NOTE — ED PROVIDER NOTE - MEDICAL DECISION MAKING DETAILS
cxr clear. pt feeling better with nebs. dc with prednisone. likely uri triggering asthma. Discussed results and outcome of testing with the patient.  Patient given copy of available results. Patient advised to please follow up with their PMD within the next 24 hours and return to the Emergency Department for worsening symptoms or any other concerns.

## 2020-09-22 NOTE — ED CDU PROVIDER NOTE - PSH
DATE OF SERVICE:  09/22/2020



Mr. Neff was found to have a positive COVID nasal swab yesterday.  Yesterday, he

had been asymptomatic in terms of any symptoms for COVID.  The patient yesterday had

negative cardiac enzymes.  No EKG changes.  The pain sounds like musculoskeletal.

He was given two doses of Toradol.  The pain resolved.  It is okay with me to let

him go home. 







Job ID:  [id number] No significant past surgical history

## 2020-10-13 NOTE — ED PROVIDER NOTE - SKIN, MLM
Social Work Progress Note      Data/Intervention:  Patient Name:  Saray Huntley  /Age:  1968 (52 year old)    Reason for Follow-Up:  Saray is a 52-year-old woman with a diagnosis of breast cancer who was originally diagnosed 2018. Saray is s/p neoadjuvant chemotherapy, lumpectomy, radiation, and 1 year Herceptin. Saray came to clinic 20 for follow-up with Dr. Stock, and expressed heightened anxiety. Saray is previously known to this clinician from when she received IV chemotherapy, this clinician called Saray today after receiving referral from MD and [facility] for emotional support.    Intervention:   Saray endorses that she felt that she was coping well with breast cancer diagnosis and treatment until 2020 when she experienced her first panic attack. Saray reported that since that time, she has experienced 2 other panic attacks, in which her sister has been able to reassure her that she is safe. Saray reports that as of late, her anxiety has been presenting as dizziness, headaches, and being more distracted. Provided safe place for Saray to express worries about shortness of breath, and heightened anxiety about recurrence of breast cancer while coping with pandemic. This clinician provided psychoeducation about how anxiety presents in the body, and the importance of identifying and addressing early patterns of anxiety before they get to such an elevated place. This clinician normalized heightened anxiety in survivorship, both with fears of recurrence, as well as processing all that the body has coped with. This clinician acknowledged that it was this time of year 2 years ago when she was initially diagnosed, at at times anniversaries for diagnoses can be times of heightened emotional distress.     Saray acknowledged that the anxiety is impacting her daily functioning, and she has stopped working out for fear of experiencing a racing heart again. Discussed use of distraction & mantras, mindfulness,  group support, and cognitive reframing. Encouraged Saray to increase physical activity in a controlled way, which will increase her capacity in coping with a racing heart knowing that it is related to working her body and not an experience of anxiety. Discussed the evidence that supports mindfulness as a strategy to support anxiety management. Saray expressed an interest in connecting with another women in breast cancer survivorship, which she anticipates will add a different dimension of support than her family or care team. Saray receptive to learning additional tools for anxiety management from Tamir Mohamud, and appreciative of this clinician following-up on anxiety support in next 2 weeks.     Resources Provided:  [employer]  [employer] [place]   [employer]  Mindfulness Resources    Plan:  1) This clinician will plan for psychosocial check-in 10/27/20 at 12pm. Saray knows to reach out to this clinician prior in the case of psychosocial distress or need.   2) Ongoing collaboration with multidisciplinary care team.     Please call or page if needs or concerns arise.     ALEA Tapia, LICSW  Direct Phone: [phone]  Pager: [phone]   Skin normal color for race, warm, dry and intact. No evidence of rash.

## 2021-04-28 NOTE — ED PROVIDER NOTE - NSTIMEPROVIDERCAREINITIATE_GEN_ER
----- Message from Declan Cerda DO sent at 4/28/2021  1:28 PM CDT -----  Please call and inform patient that her CT calcium scan showed a calcium score of zero. No concerns and very low cardiac risk. Thanks!  
Call to Pt to inform of Dr Torrez results message below on her normal Calcium heart scan.  Pt verbalized understanding and was without questions of any kind.    
31-May-2017 12:36

## 2021-05-10 NOTE — ED ADULT NURSE NOTE - RESPIRATORY WDL
Breathing spontaneous and unlabored. Breath sounds clear and equal bilaterally with regular rhythm. [id number]

## 2022-08-30 NOTE — ED ADULT NURSE NOTE - INTEGUMENTARY WDL
Injection  Injection performed in    by Vielka Barillas MA. Patient tolerated the procedure well without complications.  08/24/2022   Vielka Barillas MA     Color consistent with ethnicity/race, warm, dry intact, resilient.

## 2023-01-01 NOTE — ED CDU PROVIDER NOTE - ATTENDING CONTRIBUTION TO CARE
[facility]   Intensive Care Unit  Daily Progress Note                                               Name: Dylon Tate (Female-Alyson Vizcarra) Gian MRN# [id number]   Parents: Alyson Springer   Date/Time of Birth: 2022 4:53 AM  Date of Admission:   2023         History of Present Illness   , Gestational Age: 30w0d, appropriate for gestational age, 3 lb 2.8 oz (1440 g), female infant born by  due to concern for placental abruption.  Asked by Dr. Duran to care for this infant born at [facility].    The infant was transported to [place] NICU for further evaluation, monitoring and management of prematurity and respiratory distress.Please see telehealth consult note (Yarelis) and transport note for further details.     Patient Active Problem List   Diagnosis     infant of 30 completed weeks of gestation    Ineffective thermoregulation in     Apnea of prematurity    Respiratory distress syndrome in     VLBW baby (very low birth-weight baby)    Slow feeding in     Prematurity    Anemia    Placental abruption affecting delivery    Respiratory failure of      Interval History   Intubated s/p surfactant x2 (LMA and intubation x2)  U/S overnight for left leg swelling, ultrasound negative for DVT       Assessment & Plan     Overall Status:    29-hour old,  female infant, now at 30w1d PMA with RDS likely related to prematurity which may be exacerbated by placental abruption. Cannot rule out infectious process given urgent delivery therefore on broad spectrum antibiotics. With anemia consistent with abruption.     This patient is critically ill with respiratory failure requiring mechanical ventilation.      Vascular Access:  UVC - appropriate position(s) confirmed by radiograph following adjustment several times on admission.    FEN:    Vitals:    23 0825 23 0300   Weight: 1.44 kg (3 lb 2.8 oz) 1.44 kg (3 lb 2.8 oz)      Weight change:    0% change from birthweight    Malnutrition secondary to NPO and requiring IVF. Normoglycemic with admission glucose of 92 mg/dL.  Lab Results   Component Value Date    GLC 95 2023    GLC 99 2023   - TF goal 80 ml/kg/day  - cTPN/SMOF (GIR 6, AA 4, SMOF 2)   - Advance by 30 ml/kg/day enteral feeds + probiotics per protocol   - Consult lactation specialist and dietician  - TPN labs  - Strict I/Os, daily weights    Respiratory:Failure requiring CPAP. CXR c/w RDS.  Blood gas on admission is acceptable- Monitor respiratory status closely with blood gases prn and serial CXR. S/P surfactant (LMA, intubated surf x2).    - CBG 1-2 hours   - Extubate to bCPAP +6 21%  - CBG and CXR PRN with clinical change   - SpO2 target per GA  - Caffeine for BPD ppx    Apnea of Prematurity:  At risk due to PMA <34 weeks.    - Caffeine maintenance     Cardiovascular:  Stable - good perfusion and BP.  No murmur present.  - Obtain CCHD screen, per protocol.   - Routine CR monitoring.     ID:  Potential for sepsis in the setting of  maternal placental abruption . No IAP.   - IV Ampicillin and gentamicin- anticipate discontinuation at 48 hours  - BCx NGTD    IP Surveillance:  - routine IP surveillance tests for MRSA and SARS-CoV-2     Hematology:   > Risk for anemia of prematurity/phlebotomy/abruption. pRBCx1 on admission.    - qMonday Hgb  - 2 week ferritin  - Fe supplementation at 2 weeks and full enteral feeds    - Elevated lead levels during pregnancy, lead level pending  - Monitor hemoglobin and transfuse to maintain Hgb > 12.  Recent Labs   Lab 08/14/23  0626 08/13/23 2011 08/13/23  0537   HGB 15.3 17.9 11.6*       Jaundice:   At risk for hyperbilirubinemia due to prematurity.  Maternal blood type O+; baby blood O+, CROW negative.  - Monitor bilirubin.   -Determine need for phototherapy based on the 2022 AAP nomogram/Reese Premie Bili Tool as appropriate.    Lab Results   Component Value Date     BILITOTAL 2023    DBIL 2023      CNS:At risk for IVH/PVL due to GA <32 weeks.    - Obtain screening head ultrasounds on DOL 7 (eval for IVH) and at 35-36 wks PMA (eval for PVL).   - Developmental cares per NICU protocol  - Monitor clinical exam and weekly OFC measurements.      Toxicology:   Toxicology screening is not indicated per protocol.     Sedation/ Pain Control:  - Nonpharmacologic comfort measures.     Ophthalmology:  Red reflex on admission exam + bilaterally  At risk for ROP due to prematurity (<31 weeks Birth GA) and VLBW (<1500 gm).   - Ophthalmology consult. Routine ROP screening per guideline.     Thermoregulation:   - Monitor temperature and provide thermal support as indicated.    Psychosocial:  - Appreciate social work involvement.    HCM:  - Screening tests indicated  - [place] 24 [place]- sent prior to pRBC transfusion, 24 hour sent pending   - repeat NMS at 14 days and 30 days (Less than 2 kg at birth)  - CCHD screen at 24-48 hr and in room air.  - Hearing test at/after 35 weeks corrected gestational age.  - Carseat trial (for infants less 37 weeks or less than 1500 grams)  - OT input.  - Continue standard NICU cares and family education plan.    Immunizations   - Give Hep B immunization at 21-30 days old (BW <2000 gm) or PTD, whichever comes first.       Medications   Current Facility-Administered Medications   Medication    ampicillin (OMNIPEN) 140 mg in NS injection PEDS/NICU    Breast Milk label for barcode scanning 1 Bottle    caffeine citrate (CAFCIT) injection 14 mg    cyclopentolate-phenylephrine (CYCLOMYDRYL) 0.2-1 % ophthalmic solution 1 drop    gentamicin (PF) (GARAMYCIN) injection NICU 7 mg    heparin lock flush 1 unit/mL injection 0.5 mL    [START ON 2023] hepatitis b vaccine recombinant (ENGERIX-B) injection 10 mcg    lipids 4 oil (SMOFLIPID) 20% for neonates (Daily dose divided into 2 doses - each infused over 10 hours)      Starter TPN - 5% amino acid (PREMASOL) in 10% Dextrose 150 mL, calcium gluconate 600 mg, heparin 0.5 Units/mL    sodium chloride (PF) 0.9% PF flush 0.8 mL    sucrose (SWEET-EASE) solution 0.2-2 mL    sucrose (SWEET-EASE) solution 0.2-2 mL    tetracaine (PONTOCAINE) 0.5 % ophthalmic solution 1 drop          Physical Exam   Gen: Alert, NAD  HEENT: bCPAP in place, MMM  CV: RRR, no murmur  Resp: CTAB, comfortable WOB  Abd: Soft, +BS, NTND  Ext: WWP, MAEE  Neuro: Symmetric tone, appropriate for GA       Communications   Parents:  Name Home Phone Work Phone Mobile Phone Relationship Lgl Grd   MILTON ANNE [phone] Mother       Family lives at  [address] [place] [address] [address]  [place] [address]   needed Hmong   Updated on admission.yes    PCPs:  Infant PCP: Physician No Ref-Primary    Maternal OB PCP:   Information for the patient's mother:  Milton Anne [[id number]]   Aliza Phan     Delivering Provider:  Dr. Leon Diaz    Admission note routed to all.   is not applicable to this patient.          Health Care Team:  Patient discussed with the care team. A/P, imaging studies, laboratory data, medications and family situation reviewed.           CDU Att Admit Note: Javon      I have personally performed a face to face evaluation of this patient including review of the history and focused physical exam.  I have discussed the case and reviewed the plan of care with the PA.  36yo F PMH: anemia, asthma (no hospitalization, last exacerbation 3mo ago), , 28wk5d (EDC 17) presents with increasing productive cough and dyspnea x 2d, no fever, failing to improve with home albuterol MDI, no recent sick contacts, no chest pain. Patient reports persistent nausea/vomiting during pregnancy, currently nonsymptomatic, not relieved by Zofran. Patient treated with Duoneb x 3 and PO steroids, persistent tachypnea and wheeze.  On exam awake & alert, mild distress, mmm, lungs tachypnic, diffuse expiratory wheeze L>R, (+) crackles, tachycardic, abdomen gravid nontender, no LE edema, no calf tenderness, 2+ pulses b/l, neuro A&Ox3, no focal deficits, skin warm and dry no rash  Plan: labs, IVF hydration, continued nebs, pain control

## 2023-02-28 NOTE — ED ADULT TRIAGE NOTE - SOURCE OF INFORMATION
Patient Quality 111:Pneumonia Vaccination Status For Older Adults: Pneumococcal vaccine (PPSV23) administered on or after patient’s 60th birthday and before the end of the measurement period Detail Level: Detailed Quality 110: Preventive Care And Screening: Influenza Immunization: Influenza Immunization Administered during Influenza season Quality 226: Preventive Care And Screening: Tobacco Use: Screening And Cessation Intervention: Patient screened for tobacco use and is an ex/non-smoker

## 2024-07-12 NOTE — ED ADULT NURSE NOTE - CARDIO WDL
Is This A New Presentation, Or A Follow-Up?: Skin Lesion
How Severe Is Your Skin Lesion?: moderate
Normal rate, regular rhythm, normal S1, S2 heart sounds heard.

## 2024-09-25 NOTE — ED ADULT NURSE NOTE - GENITOURINARY ASSESSMENT
Detail Level: Detailed
Sunscreen Recommendations: Mineral Based Sun Protection with Zinc Oxide or Titanium Dioxide as the ONLY active ingredients
WDL

## 2024-12-24 NOTE — ED ADULT NURSE NOTE - CAS DISCH TRANSFER METHOD
Patient: Jailyn Dai  : 1929    Encounter Date: 2024    Subjective  Patient ID: Jailyn Dai is a 89 y.o. female who is long term resident being seen and evaluated for multiple medical problems.    Patient is alert, pleasant, forgetful.  She is noted to have diarrhea for the last few days, along with multiple other residents in the unit.  Denies abdominal pain, no vomiting or fever noted.  She denies shortness of breath, cough improved.   Her appetite is fair but she is drinking liquids.  She had no recent falls. She ambulates short distances with walker.          Review of Systems   Constitutional:  Negative for fever and unexpected weight change.   HENT:  Negative for sore throat.    Respiratory:  Positive for cough. Negative for shortness of breath.    Cardiovascular:  Negative for chest pain and palpitations.   Gastrointestinal:  Positive for diarrhea. Negative for abdominal pain, constipation, nausea and vomiting.   Genitourinary:  Negative for difficulty urinating and frequency.   Musculoskeletal:  Positive for arthralgias. Negative for back pain.   Skin:  Negative for rash.   Neurological:  Negative for dizziness, seizures and headaches.   Psychiatric/Behavioral:  Positive for confusion. Negative for agitation. The patient is nervous/anxious.        Objective  LMP  (LMP Unknown)     Physical Exam  Vitals reviewed.   Constitutional:       General: She is not in acute distress.  HENT:      Mouth/Throat:      Mouth: Mucous membranes are moist.   Cardiovascular:      Rate and Rhythm: Normal rate and regular rhythm.      Heart sounds: Normal heart sounds.   Pulmonary:      Breath sounds: Normal breath sounds. No rales.   Abdominal:      General: Bowel sounds are normal.      Palpations: Abdomen is soft.      Tenderness: There is no abdominal tenderness.   Musculoskeletal:         General: No swelling or tenderness.      Cervical back: Neck supple. No tenderness.      Right lower leg: Edema present.       Left lower leg: Edema present.   Skin:     General: Skin is warm.   Neurological:      General: No focal deficit present.      Mental Status: She is alert.         Assessment/Plan  Problem List Items Addressed This Visit       Chronic heart failure with preserved ejection fraction (HFpEF)    Gastroesophageal reflux disease without esophagitis     Other Visit Diagnoses       Acute diarrhea    -  Primary                                 Goals    None     Will obtain stool for culture, encourage fluids, monitor temperature, continue supportive care.        Electronically Signed By: Swetha Garcia MD   3/18/25  4:10 PM   Private car

## 2025-04-01 NOTE — PATIENT PROFILE ADULT. - MEDICATION HERBAL REMEDIES, PROFILE
[facility] [place] PROGRESS NOTE      Laci Esteves  : 1941  PCP: Efren Ambriz MD    Subjective  No SOB    Medications  Prior to Admission medications    Medication Sig Start Date End Date Taking? Authorizing Provider   levothyroxine 50 MCG tablet Take 50 mcg by mouth daily.   Yes Provider, Outside   vitamin B-12 (CYANOCOBALAMIN) 1000 MCG tablet Take 1 tablet by mouth daily.   Yes Provider, Outside   diphenhydrAMINE (BENADRYL) 25 MG capsule Take 25 mg by mouth daily.   Yes Provider, Outside   melatonin 3 MG Take 3 mg by mouth nightly as needed (sleep).   Yes Provider, Outside   nystatin (MYCOSTATIN) [id number] UNIT/ML suspension Take 5 mLs by mouth in the morning and 5 mLs in the evening.   Yes Provider, Outside   VITAMIN D PO Take 1 tablet by mouth daily.   Yes Provider, Outside   diphenhydrAMINE-APAP, sleep, (Tylenol PM Extra Strength)  MG/30ML Liquid Take 15 mLs by mouth nightly as needed (sleep).   Yes Provider, Outside   clopidogrel (PLAVIX) 75 MG tablet Take 75 mg by mouth every evening.  21  Yes Provider, Outside     Current Facility-Administered Medications   Medication Dose Route Frequency Provider Last Rate Last Admin    sodium chloride 0.9 % flush bag 100 mL  100 mL Intravenous Once PRN Gaviota Andrews MD        sodium chloride 0.9 % flush bag 100 mL  100 mL Intravenous Once PRN Gaviota Andrews MD        sodium chloride (NORMAL SALINE) 0.9 % bolus 1,000 mL  1,000 mL Intravenous Once PRN Gaviota Andrews MD        sodium chloride (NORMAL SALINE) 0.9 % bolus 1,000 mL  1,000 mL Intravenous Once PRN Gaviota Andrews MD        EPINEPHrine (ADRENALIN) injection 0.3 mg  0.3 mg Intramuscular Q5 Min PRN Gaviota Andrews MD        diphenhydrAMINE (BENADRYL) injection 50 mg  50 mg Intravenous Once PRN Gaviota Andrews MD        famotidine (PEPCID) injection 20 mg  20 mg Intravenous Once PRN Gaviota Andrews MD        methylPREDNISolone (SOLU-Medrol) injection 125 mg  125 mg  Intravenous Once PRN Gaviota Andrews MD        albuterol inhaler 2 puff  2 puff Inhalation Q20 Min PRN Gaviota Andrews MD        albuterol (VENTOLIN) nebulizer 5 mg  5 mg Nebulization Q20 Min PRN Gaviota Andrews MD        morphine injection 2 mg  2 mg Intravenous PRN Gaviota Andrews MD        sodium chloride 0.9% infusion   Intravenous Continuous Ya Chauhan MD 50 mL/hr at 03/31/25 1830 New Bag at 03/31/25 1830    nystatin (MYCOSTATIN) [id number] UNIT/ML suspension 500,000 Units  500,000 Units Swish & Swallow 4x Daily Ya Chauhan MD   500,000 Units at 03/31/25 2234    sodium chloride 0.9 % flush bag 100 mL  100 mL Intravenous Once PRN Gaviota Andrews MD        sodium chloride 0.9 % flush bag 100 mL  100 mL Intravenous Once PRN Gaviota Andrews MD        venetoclax (VENCLEXTA) tablet 100 mg  100 mg Oral Daily with breakfast Gaviota Andrews MD   100 mg at 03/31/25 0818    sodium chloride 0.9 % flush bag 100 mL  100 mL Intravenous Once PRN Gaviota Andrews MD        sodium chloride 0.9 % flush bag 100 mL  100 mL Intravenous Once PRN Gaviota Andrews MD        posaconazole (NOXAFIL) tablet 300 mg  300 mg Oral Daily Gaviota Andrews MD   300 mg at 03/31/25 0813    prochlorperazine (COMPAZINE) tablet 10 mg  10 mg Oral Q6H PRN Gaviota Andrews MD        allopurinol (ZYLOPRIM) tablet 300 mg  300 mg Oral Daily Gaviota Andrews MD   300 mg at 03/31/25 0813    sodium chloride (NORMAL SALINE) 0.9 % bolus 1,000 mL  1,000 mL Intravenous Once PRN Gaviota Andrews MD        sodium chloride (NORMAL SALINE) 0.9 % bolus 1,000 mL  1,000 mL Intravenous Once PRN Gaviota Andrews MD        EPINEPHrine (ADRENALIN) injection 0.3 mg  0.3 mg Intramuscular Q5 Min PRN Gaviota Andrews MD        famotidine (PEPCID) injection 20 mg  20 mg Intravenous Once PRN Gaviota Andrews MD        methylPREDNISolone (SOLU-Medrol) injection 125 mg  125 mg Intravenous Once PRN Gaviota Andrews MD        albuterol inhaler 2  puff  2 puff Inhalation Q20 Min PRN Gaviota Andrews MD        albuterol (VENTOLIN) nebulizer 5 mg  5 mg Nebulization Q20 Min PRN Gaviota Andrews MD        morphine injection 2 mg  2 mg Intravenous PRN Gaviota Andrews MD        levoFLOXacin (LEVAQUIN) tablet 500 mg  500 mg Oral QAM AC Gaviota Andrews MD   500 mg at 04/01/25 0505    acyclovir (ZOVIRAX) tablet 400 mg  400 mg Oral 2 times per day Gaviota Andrews MD   400 mg at 03/31/25 2237    ondansetron (ZOFRAN) injection 4 mg  4 mg Intravenous Q6H PRN Alana Pabon MD        acetaminophen (TYLENOL) tablet 650 mg  650 mg Oral Q4H PRN Elodia Wade DO   650 mg at 03/22/25 1814    Or    acetaminophen (TYLENOL) suppository 650 mg  650 mg Rectal Q4H PRN Elodia Wade DO        bacitracin ointment   Topical BID Alana Pabon MD   Given at 03/31/25 2237    polyethylene glycol (MIRALAX) packet 17 g  17 g Oral Daily Alana Pabon MD   17 g at 03/30/25 0817    linaclotide (LINZESS) capsule 145 mcg  145 mcg Oral QAM AC Charla Cristobal CNP   145 mcg at 04/01/25 0505    methylnaltrexone (RELISTOR) 12 MG/0.6ML injection 6 mg  6 mg Subcutaneous Every Other Day Charla Cristobal CNP   6 mg at 03/30/25 0817    morphine injection 1 mg  1 mg Intravenous Q12H PRN Alana Pabon MD   1 mg at 03/15/25 1014    bisacodyl (DULCOLAX) EC tablet 10 mg  10 mg Oral Daily PRN Alana Pabon MD   10 mg at 03/19/25 0613    acetaminophen (TYLENOL) tablet 650 mg  650 mg Oral PRN Bryce Melendrez MD   650 mg at 03/14/25 2147    diphenhydrAMINE (BENADRYL) capsule 25 mg  25 mg Oral PRN Bryce Melendrez MD   25 mg at 03/29/25 1224    [Held by provider] clopidogrel (PLAVIX) tablet 75 mg  75 mg Oral Q Evening Alana Pabon MD        levothyroxine (SYNTHROID, LEVOTHROID) tablet 50 mcg  50 mcg Oral Daily Alana Pabon MD   50 mcg at 03/31/25 0813    diphenhydrAMINE (BENADRYL) capsule 25 mg  25 mg Oral Daily Alana Pabon MD   25 mg at 03/31/25 0774    cyanocobalamin  (Vitamin B-12) tablet 1,000 mcg  1,000 mcg Oral Daily Alana Pabon MD   1,000 mcg at 03/31/25 0813    sodium chloride 0.9 % injection 10 mL  10 mL Intravenous PRN Alana Pabon MD        sodium chloride 0.9 % injection 2 mL  2 mL Intracatheter 2 times per day Alana Pabon MD   2 mL at 03/31/25 2234    HYDROcodone-acetaminophen (NORCO) 5-325 MG per tablet 1 tablet  1 tablet Oral Q6H PRN Alana aPbon MD   1 tablet at 04/01/25 0505       Review of Systems  Review of Systems   All other systems reviewed and are negative.       Physical Exam  Vitals with min/max:      Vital Last Value 24 Hour Range   Temperature 97.9 °F (36.6 °C) (04/01/25 0504) Temp  Min: 97.9 °F (36.6 °C)  Max: 99.7 °F (37.6 °C)   Pulse 75 (04/01/25 0504) Pulse  Min: 72  Max: 94   Respiratory 18 (04/01/25 0505) Resp  Min: 16  Max: 18   Non-Invasive  Blood Pressure 125/62 (04/01/25 0504) BP  Min: 115/65  Max: 132/69   Pulse Oximetry 96 % (04/01/25 0504) SpO2  Min: 96 %  Max: 97 %   Arterial   Blood Pressure   No data recorded              Intake/Output Summary (Last 24 hours) at 4/1/2025 0711  Last data filed at 3/31/2025 2233  Gross per 24 hour   Intake 337 ml   Output 975 ml   Net -638 ml       Physical Exam  Vitals reviewed.   Cardiovascular:      Rate and Rhythm: Normal rate and regular rhythm.      Heart sounds: No murmur heard.  Pulmonary:      Effort: Pulmonary effort is normal.      Breath sounds: Normal breath sounds.   Abdominal:      General: Abdomen is flat.      Palpations: Abdomen is soft.   Musculoskeletal:         General: No swelling.   Skin:     General: Skin is warm.   Neurological:      Mental Status: He is alert.            Labs  Recent Results (from the past 24 hour(s))   Fibrinogen Activity    Collection Time: 04/01/25  4:40 AM    Specimen: Blood, Venous   Result Value Ref Range    Fibrinogen 169 (L) 190 - 425 mg/dL   Partial Thromboplastin Time (PTT)    Collection Time: 04/01/25  4:40 AM    Specimen: Blood,  Venous   Result Value Ref Range    PTT 39 (H) 22 - 32 sec   Prothrombin Time (INR/PT)    Collection Time: 04/01/25  4:40 AM    Specimen: Blood, Venous   Result Value Ref Range    Protime- PT 13.7 (H) 9.7 - 11.8 sec    INR 1.3     Comprehensive Metabolic Panel    Collection Time: 04/01/25  4:40 AM    Specimen: Blood, Venous   Result Value Ref Range    Fasting Status      Sodium 133 (L) 135 - 145 mmol/L    Potassium 4.6 3.4 - 5.1 mmol/L    Chloride 103 97 - 110 mmol/L    Carbon Dioxide 25 21 - 32 mmol/L    Anion Gap 10 7 - 19 mmol/L    Glucose 107 (H) 70 - 99 mg/dL    BUN 31 (H) 6 - 20 mg/dL    Creatinine 1.34 (H) 0.67 - 1.17 mg/dL    Glomerular Filtration Rate 53 (L) >=60    BUN/Cr 23 7 - 25    Calcium 8.6 8.4 - 10.2 mg/dL    Bilirubin, Total 2.0 (H) 0.2 - 1.0 mg/dL    GOT/AST 26 <=37 Units/L    GPT/ALT 35 <64 Units/L    Alkaline Phosphatase 85 45 - 117 Units/L    Albumin 2.4 (L) 3.4 - 5.0 g/dL    Protein, Total 5.9 (L) 6.4 - 8.2 g/dL    Globulin 3.5 2.0 - 4.0 g/dL    A/G Ratio 0.7 (L) 1.0 - 2.4   Uric Acid    Collection Time: 04/01/25  4:40 AM    Specimen: Blood, Venous   Result Value Ref Range    Uric Acid 2.3 (L) 3.5 - 7.2 mg/dL   CBC with Automated Differential (performable only)    Collection Time: 04/01/25  4:40 AM    Specimen: Blood, Venous   Result Value Ref Range    WBC 1.8 (L) 4.2 - 11.0 K/mcL    RBC 2.37 (L) 4.50 - 5.90 mil/mcL    HGB 7.1 (L) 13.0 - 17.0 g/dL    HCT 21.0 (L) 39.0 - 51.0 %    MCV 88.6 78.0 - 100.0 fl    MCH 30.0 26.0 - 34.0 pg    MCHC 33.8 32.0 - 36.5 g/dL    RDW-CV 21.5 (H) 11.0 - 15.0 %    RDW-SD 65.3 (H) 39.0 - 50.0 fL    PLT 13 (LL) 140 - 450 K/mcL    NRBC 1 (H) <=0 /100 WBC       Imaging Studies  LAST ECHO/ECHO STRESS:  No valid procedures specified.    LAST MRI:  No results found for this or any previous visit.    LAST CT:  === 03/06/25 ===    CT CHEST ABDOMEN PELVIS WO CONTRAST    - Narrative -  PROCEDURE: CT CHEST ABDOMEN PELVIS WO CONTRAST    INDICATION: fever of unkknown  origin    TECHNIQUE: CT of the chest, abdomen, and pelvis without intravenous  contrast.    COMPARISON:  None.    FINDINGS:  Tracheoesophageal phonation device    Normal heart size. Normal pericardium. Calcified coronaries. Multiple  intrathoracic lymph nodes largest measuring top normal at 1 cm    Small left effusion. Trace right effusion    Patchy GGO throughout the left upper lobe. No consolidation    Normal liver and gallbladder    Normal pancreas, spleen, adrenals    Normal size kidneys. No hydronephrosis. Punctate left renal stone. Left  parapelvic cysts.    Normal size aorta. No adenopathy or ascites    Unobstructed bowel with uncomplicated colonic diverticulosis. No colitis.  Decompressed bladder    Chronic pars defects at L5-S1    - Impression -  Patchy groundglass opacities throughout the left upper lobe, nonspecific    Small left and trace right pleural effusion        IMPRESSION:        Electronically Signed by: ALBERTO LAWTON MD  Signed on: 3/20/2025 9:09 PM  Workstation ID: [id number]    ___________________________________________________________________________    CT THORACIC SPINE WO CONTRAST    - Narrative -  EXAMINATION: Computed tomography (CT) of the thoracic spine without  contrast    HISTORY: pain    TECHNIQUE: CT of the thoracic spine was performed without contrast  according to standard protocol.    FINDINGS:    Flowing anterior osteophytes with ossification of the interspinous and  anterior longitudinal ligaments as well as relative preservation of the  intervertebral disc spaces. The alignment is normal. Vertebral bodies are  normal in height without evidence of acute fracture. Mild loss of  intervertebral disc space throughout the thoracic spine. No severe spinal  canal stenosis is identified within the limitations of CT. Facet joint  alignment is normal.    - Impression -  1. No evidence of acute fracture in the thoracic spine.  2. Flowing osteophyte formation suggests diffuse  idiopathic skeletal  hyperostosis versus ankylosing spondylitis.    Electronically Signed by: KAIT ESPINOZA DO  Signed on: 3/15/2025 4:08 PM  Workstation ID: [id number]    ___________________________________________________________________________    CT LUMBAR SPINE WO CONTRAST    - Narrative -  EXAM: CT LUMBAR SPINE WO CONTRAST    CLINICAL INDICATION: Lower back pain.    COMPARISON: None available.    TECHNIQUE: Axial CT images of the lumbar spine were obtained without  intravenous contrast. Multiplanar reformatted images were performed. mA  and/or kVp was adjusted for patient size.    FINDINGS:    Chronic bilateral L5 pars interarticularis defects. There is grade 1  anterolisthesis at L5-S1. Vertebral body heights are maintained. No acute  fracture.    Mild multilevel disc space narrowing and endplate osteophyte formation.  Multilevel facet joint degenerative changes. There is mild to moderate  spinal canal stenosis at L3-L4 and L4-L5. Multilevel neural foraminal  narrowing, including severe bilateral neural foraminal stenosis at L5-S1.    No acute paraspinal soft tissue abnormality. Aortoiliac atherosclerotic  calcification. Abdominal aorta is normal in caliber.    - Impression -  No acute osseous abnormality of the lumbar spine.        Electronically Signed by: LEIF DALE M.D.  Signed on: 3/12/2025 8:30 AM  Workstation ID: [id number]    LAST EKG:  No results found for this or any previous visit (from the past 4464 hour(s)).    LAST X-RAY:  === 03/06/25 ===    XR CHEST AP OR PA    - Narrative -  EXAMINATION: XR CHEST AP OR PA    HISTORY: Dyspnea, fever    COMPARISON: Chest radiograph 3/7/2022    FINDINGS:    The lungs are clear. There is no focal consolidation, pleural effusion or  pneumothorax. The cardiac silhouette is normal in size. The superior  mediastinal contours appear normal. Degenerative changes are seen in the  spine.    - Impression -  No acute pulmonary process.    Electronically  Signed by: CLIFTON GARRIDO MD  Signed on: 3/18/2025 3:25 PM  Workstation ID: [id number]    ___________________________________________________________________________    XR ABDOMEN 1 VIEW    - Narrative -  EXAM: XR ABDOMEN 1 VIEW  PROVIDED CLINICAL INFORMATION:   constipation  Constipation  ADDITIONAL HISTORY: D61.818 Pancytopenia (CMD)  TECHNIQUE: Supine exposures in quadrant study fully include the entire  abdomen  COMPARISON: Abdomen x-ray March 14, 2025 demonstrates nonobstructive gas  pattern    FINDINGS:    TUBES/LINES/DEVICES:  None  LOWER THORAX:  Overpenetrated.  Limited evaluation of the lung parenchyma.  No focal opacity identified.    FREE INTRAPERITONEAL AIR:   No free intraperitoneal air identified  Evaluation for free air on supine examination only is limited    ABDOMINAL GAS PATTERN/ADDITIONAL OBSERVATIONS:  Scattered large and small bowel nonobstructive gas pattern.  No bowel wall  thickening or pneumatosis.  No abnormal displacement of bowel loops to  suggest intra-abdominal mass.  Average amount of fecal material in the left colon and hepatic flexure. No  evidence of constipation or concerning fecal loading or fecal impaction    Multilevel moderate-severe degenerative spondylosis and disc space  narrowing of the visualized thoracolumbar spine.    - Impression -  1.   No evidence of free intraperitoneal air  2.   Non-obstructive gas pattern  3.   No acute intra-abdominal process  4.   No interval concerning change when compared to prior exam  5.   Average amount of fecal material in the left colon and hepatic flexure  6.   No concerning fecal loading or fecal impaction      Electronically Signed by: ALAN BYRD MD  Signed on: 3/18/2025 10:41 AM  Workstation ID: [id number]    ___________________________________________________________________________    XR ABDOMEN 1 VIEW    - Narrative -  EXAM: XR ABDOMEN 1 VIEW  PROVIDED CLINICAL INFORMATION:   Constipation  ADDITIONAL HISTORY: D61.810  Pancytopenia (CMD)  TECHNIQUE: Supine upper and lower exposures  COMPARISON: None available    FINDINGS:    TUBES/LINES/DEVICES:  None  LOWER THORAX:  Overpenetrated.  Limited evaluation of the lung parenchyma.  No focal opacity identified.    FREE INTRAPERITONEAL AIR:   No free intraperitoneal air identified  Evaluation for free air on supine examination only is limited    ABDOMINAL GAS PATTERN/ADDITIONAL OBSERVATIONS:  Moderate amount of nonobstructive gas in the stomach  Scattered large and small bowel nonobstructive gas pattern.  No bowel wall  thickening or pneumatosis.  No abnormal displacement of bowel loops to  suggest intra-abdominal mass.  No significant fecal loading or fecal impaction    Multilevel moderate degenerative spondylosis of the visualized  thoracolumbar spine.  Mild degenerative arthropathy hip joint spaces bilaterally  Right hemipelvic calcifications likely phleboliths    - Impression -  1.   No evidence of free intraperitoneal air  2.   Non-obstructive gas pattern  3.   No acute intra-abdominal process  4.   No fecal impaction/fecal loading identified      Electronically Signed by: ALAN BYRD MD  Signed on: 3/14/2025 11:09 AM  Workstation ID: [id number]    LAST U/S:  === 03/06/25 ===    US LIVER AND PORTAL HEPATIC DUPLEX COMPLETE    - Narrative -  PROCEDURE: US LIVER AND PORTAL HEPATIC DUPLEX COMPLETE    HISTORY: Pancytopenia. Elevated bilirubin level. Abnormal liver function  tests.    TECHNIQUE: Real-time grayscale ultrasound of the liver with duplex  ultrasound of the hepatic inflow and outflow vessels.    COMPARISON: CT chest abdomen and pelvis 3/20/2025    FINDINGS:    Portal Vein  Doppler Analysis (main, right, left): Normal hepatopetal flow on color  Doppler. Expected mild phasicity on spectral Doppler.  Main Portal Vein Diameter: 12 mm  Main Portal Vein Peak Velocity: 50 cm/s    Hepatic Artery  Doppler Analysis: Normal antegrade flow with expected brisk systolic  upstrokes and  continuous diastolic flow.  Peak Systolic Velocity: 92 cm/s    Hepatic Veins (right, middle, left): Normal hepatofugal flow on color  Doppler. Expected phasicity on spectral Doppler.    IVC (cranial portion): Normal flow on color and spectral Doppler.    Liver: Slightly coarse hepatic echotexture. Normal echogenicity. No  discrete liver lesions identified. The liver measures 16 cm in length.    Other Findings: Incidentally noted gallbladder polyp measuring 3 mm, very  likely benign requiring no follow-up.    - Impression -  1.   Patent hepatic vasculature.  2.   Slightly coarse hepatic echotexture which can be seen with diffuse  hepatic dysfunction and/or fibrosis.  3.   Incidentally noted 3 mm gallbladder polyp, very likely benign  requiring no follow-up.    Electronically Signed by: ANASTACIO ALEXANDER M.D.  Signed on: 3/26/2025 7:46 AM  Workstation ID: [id number]    ___________________________________________________________________________    US LIVER GALLBLADDER PANCREAS (RUQ)    - Narrative -  EXAM: US ABDOMEN LIMITED    CLINICAL HISTORY: Elevated liver function tests. Abnormal labs.    COMPARISON:  None.    TECHNIQUE:  Real-time grayscale ultrasound is performed using a transabdominal  curvilinear transducer. Color and Spectral Doppler interrogation was  performed of the main portal vein to screen for portal hypertension.    FINDINGS:    Liver:  Normal echogenicity and echotexture.  It measures 15 cm in sagittal  span. No evidence of a definitive mass lesion in the visualized portion of  the liver.   No intrahepatic biliary ductal dilatation.    Main Portal Vein patency: Patent and hepatopetal flow. Normal tetraphasic  duplex waveforms.    Gallbladder: No gallstones or sludge.  Common bile duct: 5 mm.  Chandler's Sign: Absent.    Pancreas: Obscured by overlying bowel gas.    Right kidney:  Normal in echogenicity.  No hydronephrosis.    Mild splenomegaly measuring 13 cm in length.    - Impression -  Nonspecific mild  splenomegaly.    Grossly unremarkable sonographic appearance of the liver.      Electronically Signed by: FRIDA CARTER MD  Signed on: 3/19/2025 9:12 AM  Workstation ID: [id number]      Assessment/Plan   84yo M with Prior CAD admitted with MDS     High Risk MDS  Pancytopenia  DIC             Per [facility]              On chemotherapy now    Neutropenic fever  Possible knee cellulitis  Followed by ID     CAD s/p Remote PCI             Had PCI 2011             stable             Stress 2023 with no ischemia             Echo this admit with normal EF             Plavix when if able from plt standpoint     HFpEF, chronic   Appears euvolemic               BB: carvedilol 6.25mg po bid               SGLT2, MRA, RAASi: none due to ARF    Edema             Likely multifactorial with mild fluid overload intravascular and low albumin             Hold diuretic  HL             Statin       Prior Tongue CA             Per ONC     ARF  Creat increased to 1.2 from dehydation.    Bleeding from abdominal site  Mgmt per primary and onc    Continue to hold lasix  MAYRA Siegel on DC.        Eric Millan MD, FACC  4/1/2025         no